# Patient Record
Sex: FEMALE | Race: WHITE | Employment: FULL TIME
[De-identification: names, ages, dates, MRNs, and addresses within clinical notes are randomized per-mention and may not be internally consistent; named-entity substitution may affect disease eponyms.]

---

## 2023-11-29 ENCOUNTER — HOSPITAL ENCOUNTER (OUTPATIENT)
Facility: HOSPITAL | Age: 23
Discharge: HOME | End: 2023-12-02
Attending: PSYCHIATRY & NEUROLOGY
Payer: COMMERCIAL

## 2023-11-29 VITALS
RESPIRATION RATE: 14 BRPM | DIASTOLIC BLOOD PRESSURE: 80 MMHG | HEART RATE: 82 BPM | OXYGEN SATURATION: 96 % | SYSTOLIC BLOOD PRESSURE: 121 MMHG

## 2023-11-29 DIAGNOSIS — G93.2 IDIOPATHIC INTRACRANIAL HYPERTENSION: ICD-10-CM

## 2023-11-29 PROCEDURE — 62328 DX LMBR SPI PNXR W/FLUOR/CT: CPT

## 2023-11-29 PROCEDURE — 2500000003 HC RX 250 WO HCPCS: Performed by: INTERNAL MEDICINE

## 2023-11-29 RX ORDER — LIDOCAINE HYDROCHLORIDE 10 MG/ML
10 INJECTION, SOLUTION EPIDURAL; INFILTRATION; INTRACAUDAL; PERINEURAL ONCE
Status: COMPLETED | OUTPATIENT
Start: 2023-11-29 | End: 2023-11-29

## 2023-11-29 RX ADMIN — LIDOCAINE HYDROCHLORIDE ANHYDROUS 5 ML: 10 INJECTION, SOLUTION INFILTRATION at 11:42

## 2023-11-29 NOTE — PROGRESS NOTES
1140- Pt brought to radiology post lumbar puncture supine on stretcher. Pt has no C/O pain and can wiggle toes. Baseline VS taken. Pt drinking water and eating cookies. 1200- Pt has no C/O pain, pt has been in contact with her father. Discharge time 1230. Discharge instructions given to pt, pt was given opportunity to ask questions and clarification was provided. Rashad Mclaughlin discharged pt to [facility] via wheelchair to the care of her father.

## 2023-12-01 ENCOUNTER — TELEMEDICINE (OUTPATIENT)
Dept: PRIMARY CARE CLINIC | Facility: CLINIC | Age: 23
End: 2023-12-01
Payer: COMMERCIAL

## 2023-12-01 DIAGNOSIS — F32.A ANXIETY AND DEPRESSION: Primary | ICD-10-CM

## 2023-12-01 DIAGNOSIS — F41.9 ANXIETY AND DEPRESSION: Primary | ICD-10-CM

## 2023-12-01 PROCEDURE — 99214 OFFICE O/P EST MOD 30 MIN: CPT | Performed by: FAMILY MEDICINE

## 2023-12-01 RX ORDER — CITALOPRAM 20 MG/1
20 TABLET ORAL DAILY
Qty: 90 TABLET | Refills: 2 | Status: SHIPPED | OUTPATIENT
Start: 2023-12-01

## 2023-12-01 ASSESSMENT — ENCOUNTER SYMPTOMS
NAUSEA: 0
ABDOMINAL PAIN: 0
VOMITING: 0
DIARRHEA: 0

## 2023-12-04 DIAGNOSIS — E66.01 CLASS 3 SEVERE OBESITY WITH BODY MASS INDEX (BMI) OF 40.0 TO 44.9 IN ADULT, UNSPECIFIED OBESITY TYPE, UNSPECIFIED WHETHER SERIOUS COMORBIDITY PRESENT (HCC): ICD-10-CM

## 2023-12-04 DIAGNOSIS — G93.2 PSEUDOTUMOR CEREBRI: Primary | ICD-10-CM

## 2023-12-04 RX ORDER — ACETAZOLAMIDE 250 MG/1
250 TABLET ORAL 2 TIMES DAILY
Qty: 60 TABLET | Refills: 3 | Status: SHIPPED | OUTPATIENT
Start: 2023-12-04

## 2023-12-21 DIAGNOSIS — Z13.29 SCREENING FOR HYPOTHYROIDISM: ICD-10-CM

## 2023-12-21 DIAGNOSIS — Z13.220 SCREENING FOR HYPERLIPIDEMIA: ICD-10-CM

## 2023-12-21 DIAGNOSIS — R73.9 BLOOD GLUCOSE ELEVATED: ICD-10-CM

## 2023-12-21 DIAGNOSIS — E66.01 CLASS 3 SEVERE OBESITY DUE TO EXCESS CALORIES WITH SERIOUS COMORBIDITY AND BODY MASS INDEX (BMI) OF 40.0 TO 44.9 IN ADULT (HCC): ICD-10-CM

## 2023-12-22 LAB
ALBUMIN SERPL-MCNC: 4.8 G/DL (ref 4–5)
ALBUMIN/GLOB SERPL: 2.2 {RATIO} (ref 1.2–2.2)
ALP SERPL-CCNC: 105 IU/L (ref 44–121)
ALT SERPL-CCNC: 29 IU/L (ref 0–32)
AST SERPL-CCNC: 21 IU/L (ref 0–40)
BILIRUB SERPL-MCNC: 0.3 MG/DL (ref 0–1.2)
BUN SERPL-MCNC: 8 MG/DL (ref 6–20)
BUN/CREAT SERPL: 12 (ref 9–23)
CALCIUM SERPL-MCNC: 9.9 MG/DL (ref 8.7–10.2)
CHLORIDE SERPL-SCNC: 103 MMOL/L (ref 96–106)
CHOLEST SERPL-MCNC: 190 MG/DL (ref 100–199)
CO2 SERPL-SCNC: 22 MMOL/L (ref 20–29)
CREAT SERPL-MCNC: 0.68 MG/DL (ref 0.57–1)
EGFRCR SERPLBLD CKD-EPI 2021: 124 ML/MIN/1.73
GLOBULIN SER CALC-MCNC: 2.2 G/DL (ref 1.5–4.5)
GLUCOSE SERPL-MCNC: 91 MG/DL (ref 70–99)
HBA1C MFR BLD: 5.1 % (ref 4.8–5.6)
HDLC SERPL-MCNC: 34 MG/DL
INSULIN SERPL-ACNC: 36.8 UIU/ML (ref 2.6–24.9)
LDLC SERPL CALC-MCNC: 137 MG/DL (ref 0–99)
POTASSIUM SERPL-SCNC: 4.3 MMOL/L (ref 3.5–5.2)
PROT SERPL-MCNC: 7 G/DL (ref 6–8.5)
SODIUM SERPL-SCNC: 140 MMOL/L (ref 134–144)
T4 FREE SERPL-MCNC: 1.09 NG/DL (ref 0.82–1.77)
TRIGL SERPL-MCNC: 104 MG/DL (ref 0–149)
TSH SERPL DL<=0.005 MIU/L-ACNC: 2.02 UIU/ML (ref 0.45–4.5)
VLDLC SERPL CALC-MCNC: 19 MG/DL (ref 5–40)

## 2024-01-05 ENCOUNTER — NURSE ONLY (OUTPATIENT)
Age: 24
End: 2024-01-05

## 2024-01-05 VITALS
DIASTOLIC BLOOD PRESSURE: 77 MMHG | OXYGEN SATURATION: 97 % | BODY MASS INDEX: 42.22 KG/M2 | RESPIRATION RATE: 18 BRPM | SYSTOLIC BLOOD PRESSURE: 112 MMHG | HEIGHT: 63 IN | TEMPERATURE: 98.2 F | WEIGHT: 238.3 LBS | HEART RATE: 86 BPM

## 2024-01-05 DIAGNOSIS — E66.01 CLASS 3 SEVERE OBESITY DUE TO EXCESS CALORIES WITH SERIOUS COMORBIDITY AND BODY MASS INDEX (BMI) OF 40.0 TO 44.9 IN ADULT (HCC): Primary | ICD-10-CM

## 2024-01-05 DIAGNOSIS — T14.90XA TRAUMA IN CHILDHOOD: ICD-10-CM

## 2024-01-05 DIAGNOSIS — G93.2 INTRACRANIAL HYPERTENSION: ICD-10-CM

## 2024-01-05 NOTE — PROGRESS NOTES
Identified patient with two patient identifiers (name and ). Reviewed chart in preparation for visit and have obtained necessary documentation.    Guerita Mathur is a 23 y.o. female  Chief Complaint   Patient presents with    Weight Management     Triage/Modified PARK/Matheus     /77 (Site: Right Lower Arm, Position: Sitting, Cuff Size: Large Adult)   Pulse 86   Temp 98.2 °F (36.8 °C) (Oral)   Resp 18   Ht 1.6 m (5' 3\")   Wt 108.1 kg (238 lb 4.8 oz)   SpO2 97%   BMI 42.21 kg/m²     1. Have you been to the ER, urgent care clinic since your last visit?  Hospitalized since your last visit?no    2. Have you seen or consulted any other health care providers outside of the [facility] since your last visit?  Include any pap smears or colon screening. no        Progress Note: Weekly Education Class in the [facility] Weight Loss Program         Patient is on Very Low Calorie Diet [] (4 meal replacements per day, 800 kcal/day)      Low Calorie Diet [x] (2-3 meal replacements per day, 4182-7424 kcal/day)    1) Did patient have any new symptoms or physical problems?   Yes [x]    No []    If yes, check & comment: weakness [], fatigue [], lightheadedness [], headache [x], cramps [], cold intolerance [], hair loss [], diarrhea [], constipation [],  NA [] other:                                  2) Has patient had any medical attention from other providers, urgent care or the emergency room this week?  Yes [x]  No []       NA [], If yes, why: hematology follow up                                      3) Any other sugar sweetened beverages consumed this week?   Yes [x]  No []    4) Did patient have any problems adhering to the diet? Yes [x]  No [] NA []    If yes, Vacation [], Celebrations [], Conferences [], Family Reunions [x] other:                                                 5) How many hours of sleep this week? 7-8    (range)  NA []    Number of meal replacements consumed daily? 1 (range)  NA  What Type Of Note Output Would You Prefer (Optional)?: Standard Output What Is The Reason For Today's Visit?: Full Body Skin Examination What Is The Reason For Today's Visit? (Being Monitored For X): the development of new lesions How Severe Are Your Spot(S)?: mild

## 2024-01-10 ENCOUNTER — OFFICE VISIT (OUTPATIENT)
Age: 24
End: 2024-01-10

## 2024-01-10 DIAGNOSIS — E66.01 CLASS 3 SEVERE OBESITY DUE TO EXCESS CALORIES WITH SERIOUS COMORBIDITY AND BODY MASS INDEX (BMI) OF 40.0 TO 44.9 IN ADULT (HCC): Primary | ICD-10-CM

## 2024-01-12 ENCOUNTER — TELEPHONE (OUTPATIENT)
Age: 24
End: 2024-01-12

## 2024-01-12 NOTE — TELEPHONE ENCOUNTER
Patient called regarding referral to counselor. Patient will contact insurance to verify coverage/ if [facility] is in network and follow up

## 2024-01-15 ENCOUNTER — OFFICE VISIT (OUTPATIENT)
Age: 24
End: 2024-01-15

## 2024-01-15 DIAGNOSIS — E66.01 CLASS 3 SEVERE OBESITY DUE TO EXCESS CALORIES WITH SERIOUS COMORBIDITY AND BODY MASS INDEX (BMI) OF 40.0 TO 44.9 IN ADULT (HCC): Primary | ICD-10-CM

## 2024-01-15 NOTE — PROGRESS NOTES
[facility]  Metabolic Program Initial Nutrition Consult    Date: 1/15/2024   Physician: Bettina Jarvis MD  Name: Guerita Seaman  :  2000    Type of Plan: LCD  Weeks on Plan: 1 month  Virtual visit was completed through Zoom.    ASSESSMENT:    Medications/Supplements:   Prior to Admission medications    Medication Sig Start Date End Date Taking? Authorizing Provider   acetaZOLAMIDE (DIAMOX) 250 MG tablet Take 1 tablet by mouth 2 times daily  Patient taking differently: Take 1 tablet by mouth daily 23   Luke Obrien Jr., MD   citalopram (CELEXA) 20 MG tablet Take 1 tablet by mouth daily 23   Aggie Us DO   SUMAtriptan (IMITREX) 100 MG tablet Take 1 tab at onset of severe headache; may repeat another in 2 hours if headaches persist 10/10/23   Luke Obrien Jr., MD   meclizine (ANTIVERT) 25 MG tablet TAKE 1 TABLET BY MOUTH THREE TIMES A DAY AS NEEDED FOR DIZZINESS 23   Provider, MD Cristobal   butalbital-acetaminophen-caffeine (FIORICET, ESGIC) -40 MG per tablet TAKE 1 TABLET BY MOUTH EVERY 8 HOURS AS NEEDED FOR HEADACHE 23   ProviderCristobal MD   levonorgestrel (MIRENA, 52 MG,) IUD 52 mg 1 Device by IntraUTERine route once    Automatic Reconciliation, Ar   rizatriptan (MAXALT) 10 MG tablet May repeat in 2 hours if needed. Do not exceed 2 tabs in 24 hours. 22   Automatic Reconciliation, Ar       Anthropometrics:    Ht:63\"   Wt: 241#    IBW: 115#    %IBW: 209%     BMI:42    Category: Obesity Class 3    238# on chart today, patient reports a 15# loss x one month.    Pt presents today for initial nutrition consult for the [facility] - Metabolic Program.      Has attempted wt loss through various methods wt loss of noom, kyle, ww, vegetarianism, Rx.      Exercise/Physical Activity:not reported    Started meal replacements:yes  If yes, how many per day:1    Aversions/side effects to meal

## 2024-01-16 NOTE — PROGRESS NOTES
[facility]  Metabolic Weight Loss Program        Patient's Name: Guerita Seaman  : 2000    This patient is a participant at [facility] and attended the weekly virtual nutrition class hosted via [employer].      Cheryl Sierra, MS, RD, LDN

## 2024-01-17 ENCOUNTER — OFFICE VISIT (OUTPATIENT)
Age: 24
End: 2024-01-17

## 2024-01-17 DIAGNOSIS — E66.01 CLASS 3 SEVERE OBESITY DUE TO EXCESS CALORIES WITH SERIOUS COMORBIDITY AND BODY MASS INDEX (BMI) OF 40.0 TO 44.9 IN ADULT (HCC): Primary | ICD-10-CM

## 2024-01-19 ENCOUNTER — TELEMEDICINE (OUTPATIENT)
Age: 24
End: 2024-01-19
Payer: COMMERCIAL

## 2024-01-19 VITALS
WEIGHT: 231.4 LBS | HEART RATE: 71 BPM | BODY MASS INDEX: 41 KG/M2 | SYSTOLIC BLOOD PRESSURE: 109 MMHG | DIASTOLIC BLOOD PRESSURE: 72 MMHG | HEIGHT: 63 IN

## 2024-01-19 DIAGNOSIS — G93.2 INTRACRANIAL HYPERTENSION: ICD-10-CM

## 2024-01-19 DIAGNOSIS — T14.90XA TRAUMA IN CHILDHOOD: ICD-10-CM

## 2024-01-19 DIAGNOSIS — E66.01 CLASS 3 SEVERE OBESITY DUE TO EXCESS CALORIES WITH SERIOUS COMORBIDITY AND BODY MASS INDEX (BMI) OF 40.0 TO 44.9 IN ADULT (HCC): Primary | ICD-10-CM

## 2024-01-19 DIAGNOSIS — R73.9 BLOOD GLUCOSE ELEVATED: ICD-10-CM

## 2024-01-19 PROCEDURE — 99214 OFFICE O/P EST MOD 30 MIN: CPT | Performed by: FAMILY MEDICINE

## 2024-01-19 ASSESSMENT — PATIENT HEALTH QUESTIONNAIRE - PHQ9
4. FEELING TIRED OR HAVING LITTLE ENERGY: 0
8. MOVING OR SPEAKING SO SLOWLY THAT OTHER PEOPLE COULD HAVE NOTICED. OR THE OPPOSITE, BEING SO FIGETY OR RESTLESS THAT YOU HAVE BEEN MOVING AROUND A LOT MORE THAN USUAL: 0
5. POOR APPETITE OR OVEREATING: 0
SUM OF ALL RESPONSES TO PHQ QUESTIONS 1-9: 0
3. TROUBLE FALLING OR STAYING ASLEEP: 0
1. LITTLE INTEREST OR PLEASURE IN DOING THINGS: 0
SUM OF ALL RESPONSES TO PHQ QUESTIONS 1-9: 0
9. THOUGHTS THAT YOU WOULD BE BETTER OFF DEAD, OR OF HURTING YOURSELF: 0
SUM OF ALL RESPONSES TO PHQ9 QUESTIONS 1 & 2: 0
6. FEELING BAD ABOUT YOURSELF - OR THAT YOU ARE A FAILURE OR HAVE LET YOURSELF OR YOUR FAMILY DOWN: 0
2. FEELING DOWN, DEPRESSED OR HOPELESS: 0
7. TROUBLE CONCENTRATING ON THINGS, SUCH AS READING THE NEWSPAPER OR WATCHING TELEVISION: 0

## 2024-01-19 NOTE — PROGRESS NOTES
[facility]  Metabolic Weight Loss Program        Patient's Name: Guerita Seaman  : 2000    This patient is a participant at [facility] and attended the weekly virtual nutrition class hosted via [employer].      Cheryl Sierra, MS, RD, LDN

## 2024-01-31 ENCOUNTER — OFFICE VISIT (OUTPATIENT)
Age: 24
End: 2024-01-31

## 2024-01-31 DIAGNOSIS — E66.01 CLASS 3 SEVERE OBESITY DUE TO EXCESS CALORIES WITH SERIOUS COMORBIDITY AND BODY MASS INDEX (BMI) OF 40.0 TO 44.9 IN ADULT (HCC): Primary | ICD-10-CM

## 2024-02-02 ENCOUNTER — NURSE ONLY (OUTPATIENT)
Age: 24
End: 2024-02-02

## 2024-02-02 ENCOUNTER — OFFICE VISIT (OUTPATIENT)
Dept: PRIMARY CARE CLINIC | Facility: CLINIC | Age: 24
End: 2024-02-02
Payer: COMMERCIAL

## 2024-02-02 VITALS
TEMPERATURE: 98.8 F | DIASTOLIC BLOOD PRESSURE: 66 MMHG | BODY MASS INDEX: 41.64 KG/M2 | RESPIRATION RATE: 17 BRPM | OXYGEN SATURATION: 97 % | SYSTOLIC BLOOD PRESSURE: 97 MMHG | HEART RATE: 90 BPM | HEIGHT: 63 IN | WEIGHT: 235 LBS

## 2024-02-02 VITALS
TEMPERATURE: 98.2 F | WEIGHT: 233.3 LBS | HEIGHT: 63 IN | HEART RATE: 80 BPM | DIASTOLIC BLOOD PRESSURE: 71 MMHG | SYSTOLIC BLOOD PRESSURE: 110 MMHG | OXYGEN SATURATION: 98 % | RESPIRATION RATE: 16 BRPM | BODY MASS INDEX: 41.34 KG/M2

## 2024-02-02 DIAGNOSIS — R68.89 FLU-LIKE SYMPTOMS: Primary | ICD-10-CM

## 2024-02-02 DIAGNOSIS — R05.9 COUGH, UNSPECIFIED TYPE: ICD-10-CM

## 2024-02-02 DIAGNOSIS — R09.81 SINUS CONGESTION: ICD-10-CM

## 2024-02-02 DIAGNOSIS — E66.01 CLASS 3 SEVERE OBESITY DUE TO EXCESS CALORIES WITH SERIOUS COMORBIDITY AND BODY MASS INDEX (BMI) OF 40.0 TO 44.9 IN ADULT (HCC): Primary | ICD-10-CM

## 2024-02-02 DIAGNOSIS — G93.2 INTRACRANIAL HYPERTENSION: ICD-10-CM

## 2024-02-02 LAB
GROUP A STREP ANTIGEN, POC: NEGATIVE
INFLUENZA A ANTIGEN, POC: NEGATIVE
INFLUENZA B ANTIGEN, POC: NEGATIVE
LOT EXPIRE DATE: NORMAL
LOT KIT NUMBER: NORMAL
SARS-COV-2 RNA, POC: NEGATIVE
VALID INTERNAL CONTROL, POC: YES
VALID INTERNAL CONTROL: YES
VENDOR AND KIT NAME POC: NORMAL

## 2024-02-02 PROCEDURE — 99213 OFFICE O/P EST LOW 20 MIN: CPT | Performed by: FAMILY MEDICINE

## 2024-02-02 PROCEDURE — 87428 SARSCOV & INF VIR A&B AG IA: CPT | Performed by: FAMILY MEDICINE

## 2024-02-02 PROCEDURE — 87880 STREP A ASSAY W/OPTIC: CPT | Performed by: FAMILY MEDICINE

## 2024-02-02 RX ORDER — BENZONATATE 100 MG/1
100 CAPSULE ORAL 3 TIMES DAILY PRN
Qty: 30 CAPSULE | Refills: 0 | Status: SHIPPED | OUTPATIENT
Start: 2024-02-02

## 2024-02-02 RX ORDER — AMOXICILLIN AND CLAVULANATE POTASSIUM 875; 125 MG/1; MG/1
1 TABLET, FILM COATED ORAL 2 TIMES DAILY
Qty: 14 TABLET | Refills: 0 | Status: SHIPPED | OUTPATIENT
Start: 2024-02-02 | End: 2024-02-09

## 2024-02-02 ASSESSMENT — PATIENT HEALTH QUESTIONNAIRE - PHQ9
10. IF YOU CHECKED OFF ANY PROBLEMS, HOW DIFFICULT HAVE THESE PROBLEMS MADE IT FOR YOU TO DO YOUR WORK, TAKE CARE OF THINGS AT HOME, OR GET ALONG WITH OTHER PEOPLE: 0
4. FEELING TIRED OR HAVING LITTLE ENERGY: 0
8. MOVING OR SPEAKING SO SLOWLY THAT OTHER PEOPLE COULD HAVE NOTICED. OR THE OPPOSITE, BEING SO FIGETY OR RESTLESS THAT YOU HAVE BEEN MOVING AROUND A LOT MORE THAN USUAL: 0
2. FEELING DOWN, DEPRESSED OR HOPELESS: 0
SUM OF ALL RESPONSES TO PHQ QUESTIONS 1-9: 0
1. LITTLE INTEREST OR PLEASURE IN DOING THINGS: 0
SUM OF ALL RESPONSES TO PHQ QUESTIONS 1-9: 0
9. THOUGHTS THAT YOU WOULD BE BETTER OFF DEAD, OR OF HURTING YOURSELF: 0
5. POOR APPETITE OR OVEREATING: 0
SUM OF ALL RESPONSES TO PHQ9 QUESTIONS 1 & 2: 0
SUM OF ALL RESPONSES TO PHQ QUESTIONS 1-9: 0
6. FEELING BAD ABOUT YOURSELF - OR THAT YOU ARE A FAILURE OR HAVE LET YOURSELF OR YOUR FAMILY DOWN: 0
3. TROUBLE FALLING OR STAYING ASLEEP: 0
SUM OF ALL RESPONSES TO PHQ QUESTIONS 1-9: 0
7. TROUBLE CONCENTRATING ON THINGS, SUCH AS READING THE NEWSPAPER OR WATCHING TELEVISION: 0

## 2024-02-02 ASSESSMENT — ENCOUNTER SYMPTOMS
COUGH: 1
SORE THROAT: 0

## 2024-02-02 NOTE — PROGRESS NOTES
[facility]  Metabolic Weight Loss Program        Patient's Name: Guerita Seaman  : 2000    This patient is a participant at [facility] and attended the weekly virtual nutrition class hosted via [employer].      Cheryl Sierra, MS, RD, LDN

## 2024-02-02 NOTE — PROGRESS NOTES
\"Have you been to the ER, urgent care clinic since your last visit?  Hospitalized since your last visit?\"    NO    “Have you seen or consulted any other health care providers outside of [facility] since your last visit?”    NO

## 2024-02-02 NOTE — PROGRESS NOTES
Identified patient with two patient identifiers (name and ). Reviewed chart in preparation for visit and have obtained necessary documentation.    Guerita Seaman is a 23 y.o. female  Chief Complaint   Patient presents with    Weight Management     Triage/michael/modified lcd     /71 (Site: Right Upper Arm, Position: Sitting, Cuff Size: Large Adult)   Pulse 80   Temp 98.2 °F (36.8 °C) (Oral)   Resp 16   Ht 1.6 m (5' 3\")   Wt 105.8 kg (233 lb 4.8 oz)   SpO2 98%   BMI 41.33 kg/m²     1. Have you been to the ER, urgent care clinic since your last visit?  Hospitalized since your last visit?no    2. Have you seen or consulted any other health care providers outside of the [facility] since your last visit?  Include any pap smears or colon screening. No        Progress Note: Weekly Education Class in the [facility] Weight Loss Program         Patient is on Very Low Calorie Diet [] (4 meal replacements per day, 800 kcal/day)      Low Calorie Diet [x] (2-3 meal replacements per day, 8893-1499 kcal/day)    1) Did patient have any new symptoms or physical problems?   Yes [x]    No []    If yes, check & comment: weakness [], fatigue [], lightheadedness [], headache [], cramps [], cold intolerance [], hair loss [], diarrhea [x], constipation [],  NA [] other:                                  2) Has patient had any medical attention from other providers, urgent care or the emergency room this week?  Yes []  No [x]       NA [], If yes, why:                                        3) Any other sugar sweetened beverages consumed this week?   Yes [x]  No []    4) Did patient have any problems adhering to the diet? Yes [x]  No [] NA []    If yes, Vacation [x], Celebrations [], Conferences [], Family Reunions [x] other: out of town for                                                5) How many hours of sleep this week? 7-10    (range)  NA []    Number of meal replacements consumed daily? 1-2

## 2024-02-02 NOTE — PROGRESS NOTES
HPI     Chief Complaint   Patient presents with    URI     Patient was exposed to pneumonia 2 weeks ago. She states that her blood process has been really high. Sinus pressure, heart rate is high, she states she has been feeling very cold.      She is a 23 y.o. female who presents for URI symptoms.     Symptoms started on Friday/ Saturday. Took COVID test at home on  that was negative. Endorses headaches, feels ears are popping, sore throat x 2 days, sinus pressure, congestion, cough. Cough is wet but she swallows the secretions. She feels more winded and short of breath then normal. States she is having chest pain. She feels the pain is more from coughing and does not think she needs an EKG. No other sick contacts that she knows of but did attend a large  recently. No fever. Endorses chills at night. No body aches.     She has started weight loss program at [facility]. Doing meal replacement program. States she has had some diarrhea when she went up to 2 replacement meals a day so she went back to 1.     Review of Systems   Constitutional:  Positive for chills. Negative for fever.   HENT:  Positive for congestion. Negative for sore throat.    Respiratory:  Positive for cough.       Reviewed PmHx, RxHx, FmHx, SocHx, AllgHx and updated and dated in the chart.    Physical Exam:  BP 97/66   Pulse 90   Temp 98.8 °F (37.1 °C) (Oral)   Resp 17   Ht 1.6 m (5' 3\")   Wt 106.6 kg (235 lb)   SpO2 97%   BMI 41.63 kg/m²   Physical Exam  Vitals and nursing note reviewed.   Constitutional:       General: She is not in acute distress.     Appearance: Normal appearance. She is not ill-appearing.   HENT:      Right Ear: Tympanic membrane normal.      Left Ear: Tympanic membrane normal.      Nose: Rhinorrhea present.      Mouth/Throat:      Mouth: Mucous membranes are moist.      Pharynx: Posterior oropharyngeal erythema present.   Cardiovascular:      Rate and Rhythm: Normal rate and regular rhythm.

## 2024-02-07 ENCOUNTER — OFFICE VISIT (OUTPATIENT)
Age: 24
End: 2024-02-07

## 2024-02-07 DIAGNOSIS — E66.01 CLASS 3 SEVERE OBESITY DUE TO EXCESS CALORIES WITH SERIOUS COMORBIDITY AND BODY MASS INDEX (BMI) OF 40.0 TO 44.9 IN ADULT (HCC): Primary | ICD-10-CM

## 2024-02-08 ENCOUNTER — OFFICE VISIT (OUTPATIENT)
Age: 24
End: 2024-02-08

## 2024-02-08 DIAGNOSIS — E66.01 CLASS 3 SEVERE OBESITY DUE TO EXCESS CALORIES WITH SERIOUS COMORBIDITY AND BODY MASS INDEX (BMI) OF 40.0 TO 44.9 IN ADULT (HCC): Primary | ICD-10-CM

## 2024-02-12 NOTE — PROGRESS NOTES
[facility]  Metabolic Weight Loss Program        Patient's Name: Guerita Seaman  : 2000    This patient is a participant at [facility] and attended the weekly virtual nutrition class hosted via [employer]      Cheryl Sierra, MS, RD, LDN

## 2024-02-13 ENCOUNTER — TELEPHONE (OUTPATIENT)
Age: 24
End: 2024-02-13

## 2024-02-13 NOTE — TELEPHONE ENCOUNTER
Called patient in regards to rescheduling her appointment for 2/16/24.Dr Jarvis is unavailable for patients appointment due to an emergency.

## 2024-02-14 ENCOUNTER — OFFICE VISIT (OUTPATIENT)
Age: 24
End: 2024-02-14

## 2024-02-14 DIAGNOSIS — E66.01 CLASS 3 SEVERE OBESITY DUE TO EXCESS CALORIES WITH SERIOUS COMORBIDITY AND BODY MASS INDEX (BMI) OF 40.0 TO 44.9 IN ADULT (HCC): Primary | ICD-10-CM

## 2024-02-14 NOTE — PROGRESS NOTES
[facility]  Metabolic Weight Loss Program        Patient's Name: Guerita Seaman  : 2000    This patient is a participant at [facility] and attended the weekly virtual nutrition class hosted via [employer].      Cheryl Sierra, MS, RD, LDN

## 2024-02-17 DIAGNOSIS — G93.2 PSEUDOTUMOR CEREBRI: ICD-10-CM

## 2024-02-19 RX ORDER — ACETAZOLAMIDE 250 MG/1
250 TABLET ORAL 2 TIMES DAILY
Qty: 180 TABLET | Refills: 1 | Status: SHIPPED | OUTPATIENT
Start: 2024-02-19

## 2024-02-21 ENCOUNTER — OFFICE VISIT (OUTPATIENT)
Age: 24
End: 2024-02-21

## 2024-02-21 DIAGNOSIS — E66.01 CLASS 3 SEVERE OBESITY DUE TO EXCESS CALORIES WITH SERIOUS COMORBIDITY AND BODY MASS INDEX (BMI) OF 40.0 TO 44.9 IN ADULT (HCC): Primary | ICD-10-CM

## 2024-02-22 ENCOUNTER — OFFICE VISIT (OUTPATIENT)
Age: 24
End: 2024-02-22
Payer: COMMERCIAL

## 2024-02-22 VITALS
RESPIRATION RATE: 16 BRPM | HEIGHT: 63 IN | HEART RATE: 74 BPM | TEMPERATURE: 98.8 F | SYSTOLIC BLOOD PRESSURE: 116 MMHG | OXYGEN SATURATION: 98 % | WEIGHT: 227 LBS | BODY MASS INDEX: 40.22 KG/M2 | DIASTOLIC BLOOD PRESSURE: 78 MMHG

## 2024-02-22 DIAGNOSIS — G44.86 CERVICOGENIC HEADACHE: ICD-10-CM

## 2024-02-22 DIAGNOSIS — R42 VERTIGO: ICD-10-CM

## 2024-02-22 DIAGNOSIS — G43.709 CHRONIC MIGRAINE W/O AURA, NOT INTRACTABLE, W/O STAT MIGR: ICD-10-CM

## 2024-02-22 DIAGNOSIS — G93.2 PSEUDOTUMOR CEREBRI: Primary | ICD-10-CM

## 2024-02-22 DIAGNOSIS — E66.01 CLASS 3 SEVERE OBESITY WITH BODY MASS INDEX (BMI) OF 40.0 TO 44.9 IN ADULT, UNSPECIFIED OBESITY TYPE, UNSPECIFIED WHETHER SERIOUS COMORBIDITY PRESENT (HCC): ICD-10-CM

## 2024-02-22 DIAGNOSIS — M54.81 OCCIPITAL NEURALGIA OF RIGHT SIDE: ICD-10-CM

## 2024-02-22 PROCEDURE — 99215 OFFICE O/P EST HI 40 MIN: CPT | Performed by: PSYCHIATRY & NEUROLOGY

## 2024-02-22 NOTE — PROGRESS NOTES
NEUROLOGY CLINIC NOTE    Patient ID:  Guerita Seaman  [id number]  23 y.o.  2000    Date of Visit:  February 22, 2024    Reason for Visit:  idiopathic intracranial hypertension, dizziness/vertigo    Chief Complaint   Patient presents with    Dizziness     Patient states 3 days after LP her dizziness has gotten worse and the Diamox has not helped and the having the dizziness on a daily occurrence since November and  has been getting worse and the dizzy spells last longer and affect her balance and vision.        History of Present Illness:     There are no problems to display for this patient.    Past Medical History:   Diagnosis Date    Ankylosing spondylitis (HCC)     was in process of w/u for this vs SLE.  w/u incomplete.    Disease of blood and blood forming organ     Migraine     PCOS (polycystic ovarian syndrome)     Routine Papanicolaou smear 12/30/2021    normal    Sleep difficulties     Von Willebrand disease (HCC)     borderline      Past Surgical History:   Procedure Laterality Date    WRIST FRACTURE SURGERY        Prior to Admission medications    Medication Sig Start Date End Date Taking? Authorizing Provider   acetaZOLAMIDE (DIAMOX) 250 MG tablet TAKE 1 TABLET BY MOUTH TWICE A DAY  Patient taking differently: Take 1 tablet by mouth daily 2/19/24  Yes Luke Obrien Jr., MD   citalopram (CELEXA) 20 MG tablet Take 1 tablet by mouth daily 12/1/23  Yes Aggie Us DO   SUMAtriptan (IMITREX) 100 MG tablet Take 1 tab at onset of severe headache; may repeat another in 2 hours if headaches persist 10/10/23  Yes Luek Obrien Jr., MD   meclizine (ANTIVERT) 25 MG tablet TAKE 1 TABLET BY MOUTH THREE TIMES A DAY AS NEEDED FOR DIZZINESS 7/14/23  Yes Provider, MD Cristobal   butalbital-acetaminophen-caffeine (FIORICET, ESGIC) -40 MG per tablet TAKE 1 TABLET BY MOUTH EVERY 8 HOURS AS NEEDED FOR HEADACHE 7/14/23  Yes ProviderCristobal MD   levonorgestrel (MIRENA, 52 MG,) IUD 52 mg 1

## 2024-02-23 ENCOUNTER — OFFICE VISIT (OUTPATIENT)
Age: 24
End: 2024-02-23

## 2024-02-23 DIAGNOSIS — E66.01 CLASS 3 SEVERE OBESITY DUE TO EXCESS CALORIES WITH SERIOUS COMORBIDITY AND BODY MASS INDEX (BMI) OF 40.0 TO 44.9 IN ADULT (HCC): Primary | ICD-10-CM

## 2024-02-23 NOTE — PROGRESS NOTES
[facility]  Metabolic Weight Loss Program        Patient's Name: Guerita Seaman  : 2000    This patient is a participant at [facility] and attended the weekly virtual nutrition class hosted via [employer].      Cheryl Sierra, MS, RD, LDN

## 2024-02-28 ENCOUNTER — OFFICE VISIT (OUTPATIENT)
Age: 24
End: 2024-02-28

## 2024-02-28 DIAGNOSIS — E66.01 CLASS 3 SEVERE OBESITY DUE TO EXCESS CALORIES WITH SERIOUS COMORBIDITY AND BODY MASS INDEX (BMI) OF 40.0 TO 44.9 IN ADULT (HCC): Primary | ICD-10-CM

## 2024-02-29 ENCOUNTER — HOSPITAL ENCOUNTER (OUTPATIENT)
Facility: HOSPITAL | Age: 24
Discharge: HOME | End: 2024-02-29
Attending: PSYCHIATRY & NEUROLOGY
Payer: COMMERCIAL

## 2024-02-29 DIAGNOSIS — G93.2 PSEUDOTUMOR CEREBRI: ICD-10-CM

## 2024-02-29 DIAGNOSIS — R42 VERTIGO: ICD-10-CM

## 2024-02-29 PROCEDURE — 70551 MRI BRAIN STEM W/O DYE: CPT

## 2024-03-01 NOTE — PROGRESS NOTES
[facility]  Metabolic Weight Loss Program        Patient's Name: Guerita Seaman  : 2000    This patient is a participant at [facility] and attended the weekly virtual nutrition class hosted via [employer].      Cheryl Sierra, MS, RD, LDN

## 2024-03-06 ENCOUNTER — OFFICE VISIT (OUTPATIENT)
Age: 24
End: 2024-03-06

## 2024-03-06 DIAGNOSIS — E66.01 CLASS 3 SEVERE OBESITY DUE TO EXCESS CALORIES WITH SERIOUS COMORBIDITY AND BODY MASS INDEX (BMI) OF 40.0 TO 44.9 IN ADULT (HCC): Primary | ICD-10-CM

## 2024-03-07 NOTE — PROGRESS NOTES
[facility]  Metabolic Weight Loss Program        Patient's Name: Guerita Seaman  : 2000    This patient is a participant at [facility] and attended the weekly virtual nutrition class hosted via [employer].      Cheryl Sierra, MS, RD, LDN

## 2024-03-13 ENCOUNTER — OFFICE VISIT (OUTPATIENT)
Age: 24
End: 2024-03-13

## 2024-03-13 DIAGNOSIS — E66.01 CLASS 3 SEVERE OBESITY DUE TO EXCESS CALORIES WITH SERIOUS COMORBIDITY AND BODY MASS INDEX (BMI) OF 40.0 TO 44.9 IN ADULT (HCC): Primary | ICD-10-CM

## 2024-03-15 ENCOUNTER — OFFICE VISIT (OUTPATIENT)
Age: 24
End: 2024-03-15

## 2024-03-15 VITALS
BODY MASS INDEX: 40.57 KG/M2 | WEIGHT: 229 LBS | HEART RATE: 88 BPM | SYSTOLIC BLOOD PRESSURE: 122 MMHG | DIASTOLIC BLOOD PRESSURE: 81 MMHG

## 2024-03-15 DIAGNOSIS — N83.202 CYST OF LEFT OVARY: ICD-10-CM

## 2024-03-15 DIAGNOSIS — Z01.419 ENCOUNTER FOR WELL WOMAN EXAM WITH ROUTINE GYNECOLOGICAL EXAM: Primary | ICD-10-CM

## 2024-03-15 DIAGNOSIS — Z12.4 SCREENING FOR CERVICAL CANCER: ICD-10-CM

## 2024-03-15 RX ORDER — CLINDAMYCIN PHOSPHATE 100 MG/5G
1 GEL VAGINAL ONCE
Qty: 5 G | Refills: 0 | Status: SHIPPED | OUTPATIENT
Start: 2024-03-15 | End: 2024-03-15

## 2024-03-15 NOTE — PROGRESS NOTES
Guerita Seaman is a 24 y.o. female returns for an annual exam     Chief Complaint   Patient presents with    Annual Exam    Ovarian Cyst       Patient's last menstrual period was 03/07/2024.  Her periods are light in flow and usually regular with a 26-32 day interval with 3-7 day duration.  She does not have dysmenorrhea.  Problems: \"some pain from the cyst\"  Birth Control: IUD.  Last Pap: normal obtained 12/30/21  She does not have a history of CHRISSY 2, 3 or cervical cancer.   With regard to the Gardisil vaccine, she declines to receive it      Also here today for an ultrasound for ovarian cyst and it showed:    TV ULTRASOUND PERFORMED. UTERUS IS ANTEVERTED, NORMAL IN SIZE AND ECHOGENICITY. ENDOMETRIUM MEASURES 6-7MM IN THICKNESS. NO EVIDENCE OF MASSES OR ABNORMALITIES ARE SEEN. IUD IS SEEN IN THE PROPER POSITION WITHIN THE ENDOMETRIAL CAVITY IN THE UTERINE FUNDUS. RIGHT OVARY APPEARS WITHIN NORMAL LIMITS. LEFT OVARY APPEARS TO HAVE A SIMPLE CYST THAT MEASURES 25 X 21 X 20MM. THERE APPEARS TO BE A DECREASE IN SIZE COMPARED TO 10/13/2023 ULTRASOUND. FREE FLUID SEEN IN THE CDS.     
lifestyle  Return for yearly wellness visits  Patient verbalized understanding        Orders Placed This Encounter    US NON OB TRANSVAGINAL     Standing Status:   Future     Standing Expiration Date:   3/15/2025    Pap Lb, rfx HPV ASCU     Order Specific Question:   Pap Source?          (Required)     Answer:   CERVIX / ENDOCERVIX     Order Specific Question:   Pap collection method?          (Required)     Answer:   BRUSH-SPATULA     Order Specific Question:   Menstrual Status ?     Answer:   IUD [[id number]]    Clindamycin Phosphate (XACIATO) 2 % GEL     Sig: Place 1 applicator vaginally once for 1 dose     Dispense:  5 g     Refill:  0

## 2024-03-20 ENCOUNTER — OFFICE VISIT (OUTPATIENT)
Age: 24
End: 2024-03-20

## 2024-03-20 DIAGNOSIS — E66.01 CLASS 3 SEVERE OBESITY DUE TO EXCESS CALORIES WITH SERIOUS COMORBIDITY AND BODY MASS INDEX (BMI) OF 40.0 TO 44.9 IN ADULT (HCC): Primary | ICD-10-CM

## 2024-03-20 LAB
., LABCORP: NORMAL
CYTOLOGIST CVX/VAG CYTO: NORMAL
CYTOLOGY CVX/VAG DOC CYTO: NORMAL
CYTOLOGY CVX/VAG DOC THIN PREP: NORMAL
DX ICD CODE: NORMAL
Lab: NORMAL
OTHER STN SPEC: NORMAL
STAT OF ADQ CVX/VAG CYTO-IMP: NORMAL

## 2024-03-21 ENCOUNTER — TELEPHONE (OUTPATIENT)
Dept: PRIMARY CARE CLINIC | Facility: CLINIC | Age: 24
End: 2024-03-21

## 2024-03-21 DIAGNOSIS — G93.2 PSEUDOTUMOR CEREBRI: Primary | ICD-10-CM

## 2024-03-21 DIAGNOSIS — G43.709 CHRONIC MIGRAINE W/O AURA, NOT INTRACTABLE, W/O STAT MIGR: ICD-10-CM

## 2024-03-21 RX ORDER — TOPIRAMATE 25 MG/1
TABLET ORAL
Qty: 120 TABLET | Refills: 5 | Status: SHIPPED | OUTPATIENT
Start: 2024-03-21

## 2024-03-21 NOTE — TELEPHONE ENCOUNTER
----- Message from Guerita Seaman sent at 3/21/2024  8:43 AM EDT -----  Regarding: Medications  Contact: [phone]  Good morning! I just had a quick question for you, my neurologist previously prescribed Sumitriptan for my headaches but I have not yet taken it. I've read that it may be iffy to take while I'm also on Celexa but I wanted to get your thoughts on this. Am I able to take both of them?

## 2024-03-22 ENCOUNTER — TELEPHONE (OUTPATIENT)
Age: 24
End: 2024-03-22

## 2024-03-22 ENCOUNTER — OFFICE VISIT (OUTPATIENT)
Age: 24
End: 2024-03-22

## 2024-03-22 ENCOUNTER — TELEMEDICINE (OUTPATIENT)
Age: 24
End: 2024-03-22
Payer: COMMERCIAL

## 2024-03-22 VITALS
TEMPERATURE: 98 F | WEIGHT: 226.1 LBS | DIASTOLIC BLOOD PRESSURE: 81 MMHG | SYSTOLIC BLOOD PRESSURE: 119 MMHG | HEIGHT: 63 IN | BODY MASS INDEX: 40.06 KG/M2

## 2024-03-22 DIAGNOSIS — E66.01 CLASS 3 SEVERE OBESITY DUE TO EXCESS CALORIES WITH SERIOUS COMORBIDITY AND BODY MASS INDEX (BMI) OF 40.0 TO 44.9 IN ADULT (HCC): Primary | ICD-10-CM

## 2024-03-22 DIAGNOSIS — R73.9 BLOOD GLUCOSE ELEVATED: ICD-10-CM

## 2024-03-22 DIAGNOSIS — G93.2 INTRACRANIAL HYPERTENSION: ICD-10-CM

## 2024-03-22 DIAGNOSIS — T14.90XA TRAUMA IN CHILDHOOD: ICD-10-CM

## 2024-03-22 PROCEDURE — 99213 OFFICE O/P EST LOW 20 MIN: CPT | Performed by: FAMILY MEDICINE

## 2024-03-22 RX ORDER — CLINDAMYCIN PHOSPHATE 100 MG/5G
GEL VAGINAL
COMMUNITY
Start: 2024-03-15

## 2024-03-22 ASSESSMENT — PATIENT HEALTH QUESTIONNAIRE - PHQ9
SUM OF ALL RESPONSES TO PHQ QUESTIONS 1-9: 0
7. TROUBLE CONCENTRATING ON THINGS, SUCH AS READING THE NEWSPAPER OR WATCHING TELEVISION: NOT AT ALL
3. TROUBLE FALLING OR STAYING ASLEEP: NOT AT ALL
SUM OF ALL RESPONSES TO PHQ QUESTIONS 1-9: 0
SUM OF ALL RESPONSES TO PHQ QUESTIONS 1-9: 0
4. FEELING TIRED OR HAVING LITTLE ENERGY: NOT AT ALL
1. LITTLE INTEREST OR PLEASURE IN DOING THINGS: NOT AT ALL
9. THOUGHTS THAT YOU WOULD BE BETTER OFF DEAD, OR OF HURTING YOURSELF: NOT AT ALL
6. FEELING BAD ABOUT YOURSELF - OR THAT YOU ARE A FAILURE OR HAVE LET YOURSELF OR YOUR FAMILY DOWN: NOT AT ALL
SUM OF ALL RESPONSES TO PHQ9 QUESTIONS 1 & 2: 0
SUM OF ALL RESPONSES TO PHQ QUESTIONS 1-9: 0
8. MOVING OR SPEAKING SO SLOWLY THAT OTHER PEOPLE COULD HAVE NOTICED. OR THE OPPOSITE, BEING SO FIGETY OR RESTLESS THAT YOU HAVE BEEN MOVING AROUND A LOT MORE THAN USUAL: NOT AT ALL
2. FEELING DOWN, DEPRESSED OR HOPELESS: NOT AT ALL
5. POOR APPETITE OR OVEREATING: NOT AT ALL

## 2024-03-22 NOTE — PROGRESS NOTES
Identified pt with two pt identifiers (name and ). Reviewed chart in preparation for visit and have obtained necessary documentation.    Guerita Seaman is a 24 y.o. female  Chief Complaint   Patient presents with    Weight Management     LCD     /81   Temp 98 °F (36.7 °C)   Ht 1.6 m (5' 3\")   Wt 102.6 kg (226 lb 1.6 oz)   LMP 2024   BMI 40.05 kg/m²     1. Have you been to the ER, urgent care clinic since your last visit?  Hospitalized since your last visit?no    2. Have you seen or consulted any other health care providers outside of the [facility] since your last visit?  Include any pap smears or colon screening. yes - ENT   
were discussed as indicated.  I advised her to contact the office if her condition worsens, changes or fails to improve as anticipated. She expressed understanding with the diagnosis(es) and plan.     Pursuant to the emergency declaration under the Romero Act and the National Emergencies Act, 1135 waiver authority and the Coronavirus Preparedness and Response Supplemental Appropriations Act, this Virtual  Visit was conducted, with patient's consent, to reduce the patient's risk of exposure to COVID-19 and provide continuity of care for an established patient.     Services were provided through a video synchronous discussion virtually to substitute for in-person clinic visit.    Bettina Jarvis MD

## 2024-03-22 NOTE — PROGRESS NOTES
weeks.  Reviewed LCD guidelines, followup one month.      Specific tips and techniques to facilitate compliance with above recommendations were provided and discussed.   If further details are desired please contact me at [phone].  This phone number was also provided to the patient for any further questions or concerns.          Cheryl Sierra, MS, RD, LDN

## 2024-03-22 NOTE — PROGRESS NOTES
[facility]  Metabolic Weight Loss Program        Patient's Name: Guerita Seaman  : 2000    This patient is a participant at [facility] and attended the weekly virtual nutrition class hosted via [employer].      Cheryl Sierra, MS, RD, LDN

## 2024-04-03 ENCOUNTER — OFFICE VISIT (OUTPATIENT)
Age: 24
End: 2024-04-03

## 2024-04-03 DIAGNOSIS — E66.01 CLASS 3 SEVERE OBESITY DUE TO EXCESS CALORIES WITH SERIOUS COMORBIDITY AND BODY MASS INDEX (BMI) OF 40.0 TO 44.9 IN ADULT (HCC): Primary | ICD-10-CM

## 2024-04-05 ENCOUNTER — OFFICE VISIT (OUTPATIENT)
Age: 24
End: 2024-04-05
Payer: COMMERCIAL

## 2024-04-05 VITALS
SYSTOLIC BLOOD PRESSURE: 110 MMHG | HEART RATE: 79 BPM | HEIGHT: 63 IN | RESPIRATION RATE: 16 BRPM | BODY MASS INDEX: 40.4 KG/M2 | TEMPERATURE: 97.1 F | WEIGHT: 228 LBS | DIASTOLIC BLOOD PRESSURE: 72 MMHG | OXYGEN SATURATION: 98 %

## 2024-04-05 DIAGNOSIS — M54.81 OCCIPITAL NEURALGIA OF RIGHT SIDE: ICD-10-CM

## 2024-04-05 DIAGNOSIS — G93.2 PSEUDOTUMOR CEREBRI: Primary | ICD-10-CM

## 2024-04-05 DIAGNOSIS — G44.86 CERVICOGENIC HEADACHE: ICD-10-CM

## 2024-04-05 DIAGNOSIS — E66.01 CLASS 3 SEVERE OBESITY WITH BODY MASS INDEX (BMI) OF 40.0 TO 44.9 IN ADULT, UNSPECIFIED OBESITY TYPE, UNSPECIFIED WHETHER SERIOUS COMORBIDITY PRESENT (HCC): ICD-10-CM

## 2024-04-05 DIAGNOSIS — G43.709 CHRONIC MIGRAINE W/O AURA, NOT INTRACTABLE, W/O STAT MIGR: ICD-10-CM

## 2024-04-05 PROCEDURE — 99214 OFFICE O/P EST MOD 30 MIN: CPT | Performed by: PSYCHIATRY & NEUROLOGY

## 2024-04-05 NOTE — PROGRESS NOTES
tab BID x 2 weeks; then 2 tabs BID 3/21/24   Luke Obrien Jr., MD     Allergies   Allergen Reactions    Other Rash     Medical tape    Saxenda [Liraglutide -Weight Management] Rash     Raised, red itchy rash around area of injection      Social History     Tobacco Use    Smoking status: Never    Smokeless tobacco: Never   Substance Use Topics    Alcohol use: Yes     Comment: soc      Family History   Problem Relation Age of Onset    Stroke Mother     Breast Cancer Mother 55        genetic testing negative     Other Mother         increased intracranial pressure (?genetic)    Skin Cancer Mother     Migraines Mother     Other Father         ankylosing spondylitis; Bruce-Danlos    Von Willebrands Disease Father     Migraines Father     Diabetes Maternal Grandmother     Breast Cancer Maternal Grandmother 83    Diabetes Maternal Grandfather         Subjective:      Guerita Seaman is a 24 y.o. female with a history of ankylosing spondylitis, PCOS, borderline Von Willebrand disease and migraine headaches who is here for further evaluation of her history of idiopathic intracranial hypertension. Patient is here for follow up.    Headaches ongoing since 2019  Diagnosed with migraine    Saw neurology at [place] twice before moving to [place] and treated with Rizatriptan as needed with benefit.    Sudden onset  Above the right eye and goes to the back of the neck  Stabbing pain around the right eye  When going to the back it is more pressure and throbbing  At its worst a 9/10  Associated with blurring of vision, light sensitivity, problem focusing, swishing sound in the ear  Lasts for a couple of hours to 1 day  Occurs almost daily  Out in the sun, it may trigger a headache. No worsening or relieving factors  Rizatriptan and tylenol    Works as a  and computer work  Problems staying asleep. 2 to 3 episodes of waking up. Averages 6 hours. Does not feel rested. Tired always. Admits to daytime sleepiness.

## 2024-04-08 NOTE — PROGRESS NOTES
[facility]  Metabolic Weight Loss Program        Patient's Name: Guerita Seaman  : 2000    This patient is a participant at [facility] and attended the weekly virtual nutrition class hosted via [email or web address].      Cheryl Sierra, MS, RD, LDN

## 2024-04-10 ENCOUNTER — OFFICE VISIT (OUTPATIENT)
Age: 24
End: 2024-04-10

## 2024-04-10 DIAGNOSIS — E66.01 CLASS 3 SEVERE OBESITY DUE TO EXCESS CALORIES WITH SERIOUS COMORBIDITY AND BODY MASS INDEX (BMI) OF 40.0 TO 44.9 IN ADULT (HCC): Primary | ICD-10-CM

## 2024-04-17 ENCOUNTER — OFFICE VISIT (OUTPATIENT)
Age: 24
End: 2024-04-17

## 2024-04-17 DIAGNOSIS — E66.01 CLASS 3 SEVERE OBESITY DUE TO EXCESS CALORIES WITH SERIOUS COMORBIDITY AND BODY MASS INDEX (BMI) OF 40.0 TO 44.9 IN ADULT (HCC): Primary | ICD-10-CM

## 2024-04-18 ENCOUNTER — OFFICE VISIT (OUTPATIENT)
Age: 24
End: 2024-04-18
Payer: COMMERCIAL

## 2024-04-18 VITALS
OXYGEN SATURATION: 98 % | SYSTOLIC BLOOD PRESSURE: 112 MMHG | WEIGHT: 227.8 LBS | HEART RATE: 83 BPM | BODY MASS INDEX: 40.36 KG/M2 | HEIGHT: 63 IN | TEMPERATURE: 98.4 F | RESPIRATION RATE: 18 BRPM | DIASTOLIC BLOOD PRESSURE: 76 MMHG

## 2024-04-18 DIAGNOSIS — E66.01 CLASS 3 SEVERE OBESITY DUE TO EXCESS CALORIES WITH SERIOUS COMORBIDITY AND BODY MASS INDEX (BMI) OF 40.0 TO 44.9 IN ADULT (HCC): Primary | ICD-10-CM

## 2024-04-18 DIAGNOSIS — T14.90XA TRAUMA IN CHILDHOOD: ICD-10-CM

## 2024-04-18 DIAGNOSIS — R73.9 BLOOD GLUCOSE ELEVATED: ICD-10-CM

## 2024-04-18 DIAGNOSIS — E28.2 PCOS (POLYCYSTIC OVARIAN SYNDROME): ICD-10-CM

## 2024-04-18 DIAGNOSIS — G93.2 INTRACRANIAL HYPERTENSION: ICD-10-CM

## 2024-04-18 PROCEDURE — 99214 OFFICE O/P EST MOD 30 MIN: CPT | Performed by: FAMILY MEDICINE

## 2024-04-18 RX ORDER — AZELAIC ACID 0.15 G/G
GEL TOPICAL AS NEEDED
COMMUNITY
Start: 2024-04-12

## 2024-04-18 ASSESSMENT — PATIENT HEALTH QUESTIONNAIRE - PHQ9
SUM OF ALL RESPONSES TO PHQ QUESTIONS 1-9: 0
2. FEELING DOWN, DEPRESSED OR HOPELESS: NOT AT ALL
SUM OF ALL RESPONSES TO PHQ QUESTIONS 1-9: 0
1. LITTLE INTEREST OR PLEASURE IN DOING THINGS: NOT AT ALL
SUM OF ALL RESPONSES TO PHQ9 QUESTIONS 1 & 2: 0

## 2024-04-18 NOTE — PROGRESS NOTES
Identified pt with two pt identifiers (name and ). Reviewed chart in preparation for visit and have obtained necessary documentation.    Guerita Seaman is a 24 y.o. female  Chief Complaint   Patient presents with    Weight Management     /76 (Site: Right Upper Arm, Position: Sitting, Cuff Size: Large Adult)   Pulse 83   Temp 98.4 °F (36.9 °C) (Oral)   Resp 18   Ht 1.6 m (5' 3\")   Wt 103.3 kg (227 lb 12.8 oz)   LMP 2024 (Exact Date)   SpO2 98%   BMI 40.35 kg/m²     1. Have you been to the ER, urgent care clinic since your last visit?  Hospitalized since your last visit?no    2. Have you seen or consulted any other health care providers outside of the [facility] since your last visit?  Include any pap smears or colon screening. No    BMI - 40.0

## 2024-04-18 NOTE — PROGRESS NOTES
[facility] Progress Note:   F/up Physician Visit    CC: Weight Management      Guerita Seaman is a 24 y.o. female who is here for her  f/up physician visit for the / LCD Program.    March 226  Now 227    Fell off of the process recently  She got back on yesterday        4/18/2024     2:00 PM 4/18/2024     1:52 PM 4/5/2024     8:13 AM 3/22/2024    11:00 AM 3/15/2024     2:07 PM 2/22/2024     7:47 AM 2/2/2024     2:33 PM   Weight Metrics   Weight  227 lb 12.8 oz 228 lb 226 lb 1.6 oz 229 lb 227 lb 235 lb   Neck (Inches) 16 in         Waist Measure Inches 43.25 in         Body Fat % 42.1 %         BMI (Calculated)  40.4 kg/m2 40.5 kg/m2 40.1 kg/m2 0 kg/m2 40.3 kg/m2 41.7 kg/m2          No data to display                   Current Outpatient Medications   Medication Sig Dispense Refill    Azelaic Acid 15 % GEL Apply topically as needed      topiramate (TOPAMAX) 25 MG tablet Take 1 tab BID x 2 weeks; then 2 tabs BID (Patient taking differently: Take 1 tablet by mouth 2 times daily Take 1 tab BID x 2 weeks; then 2 tabs BID) 120 tablet 5    citalopram (CELEXA) 20 MG tablet Take 1 tablet by mouth daily 90 tablet 2    SUMAtriptan (IMITREX) 100 MG tablet Take 1 tab at onset of severe headache; may repeat another in 2 hours if headaches persist 9 tablet 3    meclizine (ANTIVERT) 25 MG tablet TAKE 1 TABLET BY MOUTH THREE TIMES A DAY AS NEEDED FOR DIZZINESS      butalbital-acetaminophen-caffeine (FIORICET, ESGIC) -40 MG per tablet TAKE 1 TABLET BY MOUTH EVERY 8 HOURS AS NEEDED FOR HEADACHE      levonorgestrel (MIRENA, 52 MG,) IUD 52 mg 1 Device by IntraUTERine route once       No current facility-administered medications for this visit.          Participation   Did you attend clinic and class last week? yes    Review of Systems  Since your last visit, have you experienced any complications? no  If yes, please list:       Are you taking an appetite suppressant? yes  If so, is there any Chest Pain,

## 2024-04-22 NOTE — PROGRESS NOTES
[facility]  Metabolic Weight Loss Program        Patient's Name: Guerita Seaman  : 2000    This patient is a participant at [facility] and attended the weekly virtual nutrition class hosted via [employer].      Cheryl Sierra, MS, RD, LDN

## 2024-04-24 ENCOUNTER — OFFICE VISIT (OUTPATIENT)
Age: 24
End: 2024-04-24

## 2024-04-24 DIAGNOSIS — E66.01 CLASS 3 SEVERE OBESITY DUE TO EXCESS CALORIES WITH SERIOUS COMORBIDITY AND BODY MASS INDEX (BMI) OF 40.0 TO 44.9 IN ADULT (HCC): Primary | ICD-10-CM

## 2024-04-25 DIAGNOSIS — E66.01 CLASS 3 SEVERE OBESITY DUE TO EXCESS CALORIES WITH SERIOUS COMORBIDITY AND BODY MASS INDEX (BMI) OF 40.0 TO 44.9 IN ADULT (HCC): ICD-10-CM

## 2024-04-26 ENCOUNTER — OFFICE VISIT (OUTPATIENT)
Age: 24
End: 2024-04-26

## 2024-04-26 DIAGNOSIS — E66.01 CLASS 3 SEVERE OBESITY DUE TO EXCESS CALORIES WITH SERIOUS COMORBIDITY AND BODY MASS INDEX (BMI) OF 40.0 TO 44.9 IN ADULT (HCC): Primary | ICD-10-CM

## 2024-04-26 NOTE — PROGRESS NOTES
[facility]  Metabolic Program Follow-up Nutrition Consult    Date: 2024   Physician: CAROLA Jarvis MD  Name: Guerita Seaman  :  2000    Type of Plan: LCD  Weeks on Plan: 4 months  Virtual visit was completed through Zoom.    ASSESSMENT:    Medications/Supplements:   Prior to Admission medications    Medication Sig Start Date End Date Taking? Authorizing Provider   Azelaic Acid 15 % GEL Apply topically as needed 24   Cristobal Yeung MD   topiramate (TOPAMAX) 25 MG tablet Take 1 tab BID x 2 weeks; then 2 tabs BID  Patient taking differently: Take 1 tablet by mouth 2 times daily Take 1 tab BID x 2 weeks; then 2 tabs BID 3/21/24   Luke Obrien Jr., MD   citalopram (CELEXA) 20 MG tablet Take 1 tablet by mouth daily 23   Aggie Us DO   SUMAtriptan (IMITREX) 100 MG tablet Take 1 tab at onset of severe headache; may repeat another in 2 hours if headaches persist 10/10/23   Luke Obrien Jr., MD   meclizine (ANTIVERT) 25 MG tablet TAKE 1 TABLET BY MOUTH THREE TIMES A DAY AS NEEDED FOR DIZZINESS 23   Cristobal Yeung MD   butalbital-acetaminophen-caffeine (FIORICET, ESGIC) -40 MG per tablet TAKE 1 TABLET BY MOUTH EVERY 8 HOURS AS NEEDED FOR HEADACHE 23   Cristobal Yeung MD   levonorgestrel (MIRENA, 52 MG,) IUD 52 mg 1 Device by IntraUTERine route once    Automatic Reconciliation, Ar              Starting Weight: 241#  Current Weight: 227# (229# last visit one month ago)  Overall Pounds Lost: 14# Overall Pounds Gained: 0    Exercise/Physical Activity:not reported    Is patient using [facility] products:yes  If yes, how many per day:1    Aversions/side effects of product/program:none    Fluids used to mix with products:water    Reported Diet History:  1 ND shake daily until last few weeks, fell off track. Finances were tight. Hit a plateau.  Ate what she wanted.  Visit with physician re-motivated her, more protein.  [place]

## 2024-05-01 NOTE — PROGRESS NOTES
[facility]  Metabolic Weight Loss Program        Patient's Name: Guerita Seaman  : 2000    This patient is a participant at [facility] and attended the weekly virtual nutrition class hosted via [employer].      Cheryl Sierra, MS, RD, LDN

## 2024-05-02 ENCOUNTER — OFFICE VISIT (OUTPATIENT)
Age: 24
End: 2024-05-02

## 2024-05-02 DIAGNOSIS — E66.01 CLASS 3 SEVERE OBESITY DUE TO EXCESS CALORIES WITH SERIOUS COMORBIDITY AND BODY MASS INDEX (BMI) OF 40.0 TO 44.9 IN ADULT (HCC): Primary | ICD-10-CM

## 2024-05-03 ENCOUNTER — NURSE ONLY (OUTPATIENT)
Age: 24
End: 2024-05-03

## 2024-05-03 VITALS
HEART RATE: 81 BPM | HEIGHT: 63 IN | OXYGEN SATURATION: 94 % | TEMPERATURE: 97.9 F | WEIGHT: 225.4 LBS | SYSTOLIC BLOOD PRESSURE: 106 MMHG | RESPIRATION RATE: 16 BRPM | DIASTOLIC BLOOD PRESSURE: 71 MMHG | BODY MASS INDEX: 39.94 KG/M2

## 2024-05-03 DIAGNOSIS — R73.9 BLOOD GLUCOSE ELEVATED: ICD-10-CM

## 2024-05-03 DIAGNOSIS — E66.01 CLASS 2 SEVERE OBESITY DUE TO EXCESS CALORIES WITH SERIOUS COMORBIDITY AND BODY MASS INDEX (BMI) OF 39.0 TO 39.9 IN ADULT (HCC): Primary | ICD-10-CM

## 2024-05-03 DIAGNOSIS — G93.2 INTRACRANIAL HYPERTENSION: ICD-10-CM

## 2024-05-03 DIAGNOSIS — T14.90XA TRAUMA IN CHILDHOOD: ICD-10-CM

## 2024-05-03 DIAGNOSIS — E28.2 PCOS (POLYCYSTIC OVARIAN SYNDROME): ICD-10-CM

## 2024-05-03 ASSESSMENT — PATIENT HEALTH QUESTIONNAIRE - PHQ9
9. THOUGHTS THAT YOU WOULD BE BETTER OFF DEAD, OR OF HURTING YOURSELF: NOT AT ALL
6. FEELING BAD ABOUT YOURSELF - OR THAT YOU ARE A FAILURE OR HAVE LET YOURSELF OR YOUR FAMILY DOWN: NOT AT ALL
8. MOVING OR SPEAKING SO SLOWLY THAT OTHER PEOPLE COULD HAVE NOTICED. OR THE OPPOSITE, BEING SO FIGETY OR RESTLESS THAT YOU HAVE BEEN MOVING AROUND A LOT MORE THAN USUAL: NOT AT ALL
SUM OF ALL RESPONSES TO PHQ QUESTIONS 1-9: 0
2. FEELING DOWN, DEPRESSED OR HOPELESS: NOT AT ALL
3. TROUBLE FALLING OR STAYING ASLEEP: NOT AT ALL
SUM OF ALL RESPONSES TO PHQ QUESTIONS 1-9: 0
5. POOR APPETITE OR OVEREATING: NOT AT ALL
SUM OF ALL RESPONSES TO PHQ9 QUESTIONS 1 & 2: 0
1. LITTLE INTEREST OR PLEASURE IN DOING THINGS: NOT AT ALL
10. IF YOU CHECKED OFF ANY PROBLEMS, HOW DIFFICULT HAVE THESE PROBLEMS MADE IT FOR YOU TO DO YOUR WORK, TAKE CARE OF THINGS AT HOME, OR GET ALONG WITH OTHER PEOPLE: NOT DIFFICULT AT ALL
SUM OF ALL RESPONSES TO PHQ QUESTIONS 1-9: 0
SUM OF ALL RESPONSES TO PHQ QUESTIONS 1-9: 0
7. TROUBLE CONCENTRATING ON THINGS, SUCH AS READING THE NEWSPAPER OR WATCHING TELEVISION: NOT AT ALL

## 2024-05-03 NOTE — PROGRESS NOTES
No homework      Identified patient with two patient identifiers (name and ). Reviewed chart in preparation for visit and have obtained necessary documentation.    Guerita Seaman is a 24 y.o. female  Chief Complaint   Patient presents with    Weight Management     /71 (Site: Left Upper Arm, Position: Sitting, Cuff Size: Large Adult)   Pulse 81   Temp 97.9 °F (36.6 °C) (Oral)   Resp 16   Ht 1.6 m (5' 3\")   Wt 102.2 kg (225 lb 6.4 oz)   LMP 2024 (Exact Date)   SpO2 94%   BMI 39.93 kg/m²     1. Have you been to the ER, urgent care clinic since your last visit?  Hospitalized since your last visit?no    2. Have you seen or consulted any other health care providers outside of the [facility] since your last visit?  Include any pap smears or colon screening. no

## 2024-05-06 NOTE — PROGRESS NOTES
[facility]  Metabolic Weight Loss Program        Patient's Name: Guerita Seaman  : 2000    This patient is a participant at [facility] and attended the weekly virtual nutrition class hosted via [employer].      Cheryl Sierra, MS, RD, LDN

## 2024-05-09 NOTE — PROGRESS NOTES
Guerita Seaman is a 24 y.o. female presents for a problem visit.    Chief Complaint   Patient presents with    Ovarian Cyst     Patient's last menstrual period was 04/13/2024 (exact date).  Birth Control: IUD.  Last Pap: normal obtained 3/15/24    The patient is reporting having:  ovarian cyst .  She had an ultrasound done today that showed:    TV ULTRASOUND PERFORMED. UTERUS IS ANTEVERTED, NORMAL IN SIZE AND ECHOGENICITY. ENDOMETRIUM MEASURES 3-4MM IN THICKNESS. NO EVIDENCE OF MASSES OR ABNORMALITIES ARE SEEN. IUD IS SEEN IN THE PROPER POSITION WITHIN THE ENDOMETRIAL CAVITY IN THE UTERINE FUNDUS. RIGHT OVARY APPEARS TO HAVE A COMPLEX CYST WITH BLOODFLOW IN THE PERIPHERY THAT MEASURES 16 X 13 X 10MM. THIS MAY REPRESENT A HEMORRHAGIC CYST. LEFT OVARY APPEARS WITHIN NORMAL LIMITS. A FOLLICLE IS SEEN AND MEASURES 21 X 21MM. FREE FLUID SEEN IN THE CDS

## 2024-05-10 ENCOUNTER — OFFICE VISIT (OUTPATIENT)
Age: 24
End: 2024-05-10
Payer: COMMERCIAL

## 2024-05-10 VITALS
DIASTOLIC BLOOD PRESSURE: 77 MMHG | HEART RATE: 76 BPM | SYSTOLIC BLOOD PRESSURE: 122 MMHG | WEIGHT: 223 LBS | BODY MASS INDEX: 39.5 KG/M2

## 2024-05-10 DIAGNOSIS — N83.202 CYST OF LEFT OVARY: Primary | ICD-10-CM

## 2024-05-10 DIAGNOSIS — N83.201 CYSTS OF BOTH OVARIES: ICD-10-CM

## 2024-05-10 DIAGNOSIS — N83.202 CYSTS OF BOTH OVARIES: ICD-10-CM

## 2024-05-10 PROCEDURE — 99212 OFFICE O/P EST SF 10 MIN: CPT | Performed by: OBSTETRICS & GYNECOLOGY

## 2024-05-10 NOTE — PROGRESS NOTES
Per Nursing Note:  Guerita Seaman is a 24 y.o. female presents for a problem visit.         Chief Complaint   Patient presents with    Ovarian Cyst      Patient's last menstrual period was 2024 (exact date).  Birth Control: IUD.  Last Pap: normal obtained 3/15/24     The patient is reporting having:  ovarian cyst .  She had an ultrasound done today that showed:     TV ULTRASOUND PERFORMED. UTERUS IS ANTEVERTED, NORMAL IN SIZE AND ECHOGENICITY. ENDOMETRIUM MEASURES 3-4MM IN THICKNESS. NO EVIDENCE OF MASSES OR ABNORMALITIES ARE SEEN. IUD IS SEEN IN THE PROPER POSITION WITHIN THE ENDOMETRIAL CAVITY IN THE UTERINE FUNDUS. RIGHT OVARY APPEARS TO HAVE A COMPLEX CYST WITH BLOODFLOW IN THE PERIPHERY THAT MEASURES 16 X 13 X 10MM. THIS MAY REPRESENT A HEMORRHAGIC CYST. LEFT OVARY APPEARS WITHIN NORMAL LIMITS. A FOLLICLE IS SEEN AND MEASURES 21 X 21MM. FREE FLUID SEEN IN THE CDS           OB/GYN Problem Visit        Chief Complaint   Patient presents with    Ovarian Cyst       HPI  Guerita Seaman is a ,  24 y.o. female who presents for a problem visit.   Patient's last menstrual period was 2024 (exact date).    Seen 3/15/2024 for her annual exam.  At that visit an ultrasound was also done.    This showed an IUD in proper position.  LOV with persistent cyst, simple, 2 to 2.5 cm    Has Mirena, placed 2018, primarily Having pretty regular cycles.    US today shows persistent left adnexal cyst, measuring 2cm.  Ovarian vs paratubal  ROV with cyst 1.6mm, most consistent with collapsing CL [was not present on prev US, c/w physiologic cyst]  IUD in proper position.    TV ULTRASOUND PERFORMED. UTERUS IS ANTEVERTED, NORMAL IN SIZE AND ECHOGENICITY. ENDOMETRIUM MEASURES 3-4MM IN THICKNESS. NO EVIDENCE OF MASSES OR ABNORMALITIES ARE SEEN. IUD IS SEEN IN THE PROPER POSITION WITHIN THE ENDOMETRIAL CAVITY IN THE UTERINE FUNDUS. RIGHT OVARY APPEARS TO HAVE A COMPLEX CYST WITH BLOODFLOW IN THE PERIPHERY THAT MEASURES 16 X

## 2024-05-10 NOTE — PATIENT INSTRUCTIONS
If you are able to take NSAIDs, please take either ibuprofen 600-800 mg (3 or 4 of the over the counter 200mg pills) or Aleve 2 tabs.  Please take this 2 hours before your appointment and take with food.

## 2024-05-10 NOTE — PERIOP NOTE
RETURNED CALL TO [facility] (DR. POOL'S) THAT PAT IS NOT SCHEDULED .  REQUEST IN POSTING IS FOR \"TELEPHONE\".   PLEASE LET ME KNOW IF PAT IS NEEDED INSTEAD.

## 2024-05-13 ENCOUNTER — TELEPHONE (OUTPATIENT)
Dept: PRIMARY CARE CLINIC | Facility: CLINIC | Age: 24
End: 2024-05-13

## 2024-05-13 NOTE — TELEPHONE ENCOUNTER
----- Message from Samuel Manriquez sent at 5/10/2024  2:01 PM EDT -----  Subject: Appointment Request    Reason for Call: Established Patient Appointment needed: Routine Pre-Op    QUESTIONS    Reason for appointment request? Available appointments did not meet   patient need     Additional Information for Provider? Patient is having surgery on 6/6 and   is needing a pre op physical. She is okay to be seen by anyone. She is   having surgery on tonsils and she is not sure if she needs an EKG   ---------------------------------------------------------------------------  --------------  CALL BACK INFO  [phone]; OK to leave message on voicemail  ---------------------------------------------------------------------------  --------------  SCRIPT ANSWERS

## 2024-05-20 ENCOUNTER — OFFICE VISIT (OUTPATIENT)
Dept: PRIMARY CARE CLINIC | Facility: CLINIC | Age: 24
End: 2024-05-20
Payer: COMMERCIAL

## 2024-05-20 VITALS
HEIGHT: 63 IN | TEMPERATURE: 97.8 F | RESPIRATION RATE: 18 BRPM | BODY MASS INDEX: 39.69 KG/M2 | OXYGEN SATURATION: 98 % | HEART RATE: 70 BPM | SYSTOLIC BLOOD PRESSURE: 106 MMHG | WEIGHT: 224 LBS | DIASTOLIC BLOOD PRESSURE: 70 MMHG

## 2024-05-20 DIAGNOSIS — H10.9 CONJUNCTIVITIS OF BOTH EYES, UNSPECIFIED CONJUNCTIVITIS TYPE: Primary | ICD-10-CM

## 2024-05-20 DIAGNOSIS — J35.1 ENLARGED TONSILS: ICD-10-CM

## 2024-05-20 DIAGNOSIS — Z01.818 PRE-OP EVALUATION: ICD-10-CM

## 2024-05-20 LAB
ALBUMIN SERPL-MCNC: 4 G/DL (ref 3.5–5)
ALBUMIN/GLOB SERPL: 1.2 (ref 1.1–2.2)
ALP SERPL-CCNC: 124 U/L (ref 45–117)
ALT SERPL-CCNC: 27 U/L (ref 12–78)
ANION GAP SERPL CALC-SCNC: 5 MMOL/L (ref 5–15)
AST SERPL-CCNC: 17 U/L (ref 15–37)
BILIRUB SERPL-MCNC: 0.5 MG/DL (ref 0.2–1)
BUN SERPL-MCNC: 10 MG/DL (ref 6–20)
BUN/CREAT SERPL: 12 (ref 12–20)
CALCIUM SERPL-MCNC: 9.1 MG/DL (ref 8.5–10.1)
CHLORIDE SERPL-SCNC: 111 MMOL/L (ref 97–108)
CO2 SERPL-SCNC: 24 MMOL/L (ref 21–32)
CREAT SERPL-MCNC: 0.83 MG/DL (ref 0.55–1.02)
GLOBULIN SER CALC-MCNC: 3.3 G/DL (ref 2–4)
GLUCOSE SERPL-MCNC: 88 MG/DL (ref 65–100)
POTASSIUM SERPL-SCNC: 3.8 MMOL/L (ref 3.5–5.1)
PROT SERPL-MCNC: 7.3 G/DL (ref 6.4–8.2)
SODIUM SERPL-SCNC: 140 MMOL/L (ref 136–145)

## 2024-05-20 PROCEDURE — 99214 OFFICE O/P EST MOD 30 MIN: CPT | Performed by: INTERNAL MEDICINE

## 2024-05-20 RX ORDER — CIPROFLOXACIN HYDROCHLORIDE 3.5 MG/ML
2 SOLUTION/ DROPS TOPICAL EVERY 4 HOURS
Qty: 5 ML | Refills: 0 | Status: SHIPPED | OUTPATIENT
Start: 2024-05-20 | End: 2024-05-28

## 2024-05-20 NOTE — PROGRESS NOTES
uGerita Seaman is a 24 y.o. female and presents with     Chief Complaint   Patient presents with    Pre-op Exam     Patient is having tonsils taken out June 6th    Eye Problem     Patients eye are severely red and she is concerned       History of Present Illness  The patient presents for preoperative evaluation.    The patient is scheduled for a tonsillectomy on 06/06/2024 due to recurrent tonsil stones. She experienced a severe illness a few years ago, during which her tonsils swelled up to 85 percent and never fully recovered. Currently, her tonsils appear enlarged and lumpy. She frequently experiences tonsil stones.    The patient denies any history of malignant hyperthermia or abnormal bleeding. She underwent testing for Von Willebrand's disease, which yielded a negative result. She has a history of idiopathic intracranial hypertension, for which she was on oral contraceptives for an extended period, and PCOS. She denies experiencing chest pain, shortness of breath, nosebleeds, or rectal bleeding. Her surgical history includes wisdom teeth extraction and a surgical procedure for a fractured arm at the age of 4.    The patient reports severe bilateral eye pain, with the discomfort intensifying in the corner of one eye. She does not use contact lenses. She has papilledema from IIH and dry eyes. She frequently rubs her eyes excessively, but the current episode has been persistent throughout the weekend. She expresses concern about potential conjunctivitis at her workplace. She has been using Systane eye drops, which have not provided relief.    Supplemental Information  She has a Mirena IUD in place. She is taking Celexa. She takes meclizine and sumatriptan as needed, but she has not taken it in months.   The patient denies smoking or alcohol intake. She moved here from [place] in 2021. 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   Her father has Von Willebrand's disease.   She has

## 2024-05-22 ENCOUNTER — OFFICE VISIT (OUTPATIENT)
Age: 24
End: 2024-05-22

## 2024-05-22 DIAGNOSIS — E66.01 CLASS 3 SEVERE OBESITY DUE TO EXCESS CALORIES WITH SERIOUS COMORBIDITY AND BODY MASS INDEX (BMI) OF 40.0 TO 44.9 IN ADULT (HCC): Primary | ICD-10-CM

## 2024-05-24 ENCOUNTER — TELEMEDICINE (OUTPATIENT)
Age: 24
End: 2024-05-24
Payer: COMMERCIAL

## 2024-05-24 VITALS
BODY MASS INDEX: 39.34 KG/M2 | SYSTOLIC BLOOD PRESSURE: 112 MMHG | HEART RATE: 86 BPM | DIASTOLIC BLOOD PRESSURE: 74 MMHG | HEIGHT: 63 IN | WEIGHT: 222 LBS

## 2024-05-24 DIAGNOSIS — G93.2 INTRACRANIAL HYPERTENSION: ICD-10-CM

## 2024-05-24 DIAGNOSIS — R73.9 BLOOD GLUCOSE ELEVATED: ICD-10-CM

## 2024-05-24 DIAGNOSIS — T14.90XA TRAUMA IN CHILDHOOD: ICD-10-CM

## 2024-05-24 DIAGNOSIS — E66.01 CLASS 2 SEVERE OBESITY DUE TO EXCESS CALORIES WITH SERIOUS COMORBIDITY AND BODY MASS INDEX (BMI) OF 39.0 TO 39.9 IN ADULT (HCC): Primary | ICD-10-CM

## 2024-05-24 PROCEDURE — 99214 OFFICE O/P EST MOD 30 MIN: CPT | Performed by: FAMILY MEDICINE

## 2024-05-24 ASSESSMENT — PATIENT HEALTH QUESTIONNAIRE - PHQ9
5. POOR APPETITE OR OVEREATING: NOT AT ALL
8. MOVING OR SPEAKING SO SLOWLY THAT OTHER PEOPLE COULD HAVE NOTICED. OR THE OPPOSITE, BEING SO FIGETY OR RESTLESS THAT YOU HAVE BEEN MOVING AROUND A LOT MORE THAN USUAL: NOT AT ALL
SUM OF ALL RESPONSES TO PHQ9 QUESTIONS 1 & 2: 0
6. FEELING BAD ABOUT YOURSELF - OR THAT YOU ARE A FAILURE OR HAVE LET YOURSELF OR YOUR FAMILY DOWN: NOT AT ALL
2. FEELING DOWN, DEPRESSED OR HOPELESS: NOT AT ALL
9. THOUGHTS THAT YOU WOULD BE BETTER OFF DEAD, OR OF HURTING YOURSELF: NOT AT ALL
7. TROUBLE CONCENTRATING ON THINGS, SUCH AS READING THE NEWSPAPER OR WATCHING TELEVISION: NOT AT ALL
SUM OF ALL RESPONSES TO PHQ QUESTIONS 1-9: 0
4. FEELING TIRED OR HAVING LITTLE ENERGY: NOT AT ALL
SUM OF ALL RESPONSES TO PHQ QUESTIONS 1-9: 0
1. LITTLE INTEREST OR PLEASURE IN DOING THINGS: NOT AT ALL
SUM OF ALL RESPONSES TO PHQ QUESTIONS 1-9: 0
SUM OF ALL RESPONSES TO PHQ QUESTIONS 1-9: 0
3. TROUBLE FALLING OR STAYING ASLEEP: NOT AT ALL

## 2024-05-24 NOTE — PROGRESS NOTES
[facility] Progress Note:   F/up Physician Visit    Guerita Seaman is a 24 y.o. female who was seen by synchronous (real-time) audio-video technology on 5/24/2024.      Consent:  She and/or her healthcare decision maker is aware that this patient-initiated Telehealth encounter is a billable service, with coverage as determined by her insurance carrier. She is aware that she may receive a bill and has provided verbal consent to proceed: Yes    I was at home while conducting this encounter.      712  Subjective:   Guerita Seaman was seen for Weight Management ( 1 month Modified LCD )    f/up physician visit for the  LCD Program.  227 April  Now 222    She did well this month  Prior to Admission medications    Medication Sig Start Date End Date Taking? Authorizing Provider   ciprofloxacin (CILOXAN) 0.3 % ophthalmic solution Place 2 drops into both eyes every 4 hours for 8 days 5/20/24 5/28/24 Yes Roger Pope MD   Azelaic Acid 15 % GEL Apply topically as needed 4/12/24  Yes Provider, MD Cristobal   topiramate (TOPAMAX) 25 MG tablet Take 1 tab BID x 2 weeks; then 2 tabs BID  Patient taking differently: Take 1 tablet by mouth 2 times daily Take 1 tab BID x 2 weeks; then 2 tabs BID 3/21/24  Yes Luke Obrien Jr., MD   citalopram (CELEXA) 20 MG tablet Take 1 tablet by mouth daily 12/1/23  Yes Aggie Us DO   SUMAtriptan (IMITREX) 100 MG tablet Take 1 tab at onset of severe headache; may repeat another in 2 hours if headaches persist 10/10/23  Yes Luke Obrien Jr., MD   meclizine (ANTIVERT) 25 MG tablet TAKE 1 TABLET BY MOUTH THREE TIMES A DAY AS NEEDED FOR DIZZINESS 7/14/23  Yes ProviderCristobal MD   butalbital-acetaminophen-caffeine (FIORICET, ESGIC) -40 MG per tablet TAKE 1 TABLET BY MOUTH EVERY 8 HOURS AS NEEDED FOR HEADACHE 7/14/23  Yes Cristobal Yeung MD   levonorgestrel (MIRENA, 52 MG,) IUD 52 mg 1 Device by IntraUTERine route once   Yes

## 2024-05-24 NOTE — PROGRESS NOTES
Identified pt with two pt identifiers (name and ). Reviewed chart in preparation for visit and have obtained necessary documentation.    Guerita Seaman is a 24 y.o. female  Chief Complaint   Patient presents with    Weight Management      1 month Modified LCD      /74   Pulse 86   Ht 1.6 m (5' 3\")   Wt 100.7 kg (222 lb)   BMI 39.33 kg/m²     1. Have you been to the ER, urgent care clinic since your last visit?  Hospitalized since your last visit?no    2. Have you seen or consulted any other health care providers outside of the [facility] since your last visit?  Include any pap smears or colon screening. no

## 2024-05-24 NOTE — PROGRESS NOTES
[facility]  Metabolic Weight Loss Program        Patient's Name: Guerita Seaman  : 2000    This patient is a participant at [facility] and attended the weekly virtual nutrition class hosted via [employer]      Cheryl Sierra, MS, RD, LDN

## 2024-05-29 DIAGNOSIS — F41.9 ANXIETY AND DEPRESSION: ICD-10-CM

## 2024-05-29 DIAGNOSIS — F32.A ANXIETY AND DEPRESSION: ICD-10-CM

## 2024-05-29 RX ORDER — CITALOPRAM 20 MG/1
20 TABLET ORAL DAILY
Qty: 90 TABLET | Refills: 0 | Status: SHIPPED | OUTPATIENT
Start: 2024-05-29

## 2024-05-30 ENCOUNTER — HOSPITAL ENCOUNTER (OUTPATIENT)
Facility: HOSPITAL | Age: 24
Discharge: HOME | End: 2024-05-30
Payer: COMMERCIAL

## 2024-05-30 VITALS
RESPIRATION RATE: 18 BRPM | SYSTOLIC BLOOD PRESSURE: 118 MMHG | BODY MASS INDEX: 39.34 KG/M2 | TEMPERATURE: 99.2 F | HEART RATE: 81 BPM | WEIGHT: 222 LBS | HEIGHT: 63 IN | OXYGEN SATURATION: 96 % | DIASTOLIC BLOOD PRESSURE: 81 MMHG

## 2024-05-30 LAB
ANION GAP SERPL CALC-SCNC: 6 MMOL/L (ref 5–15)
BASOPHILS # BLD: 0.1 K/UL (ref 0–0.1)
BASOPHILS NFR BLD: 1 % (ref 0–1)
BUN SERPL-MCNC: 11 MG/DL (ref 6–20)
BUN/CREAT SERPL: 13 (ref 12–20)
CALCIUM SERPL-MCNC: 9.9 MG/DL (ref 8.5–10.1)
CHLORIDE SERPL-SCNC: 106 MMOL/L (ref 97–108)
CO2 SERPL-SCNC: 27 MMOL/L (ref 21–32)
CREAT SERPL-MCNC: 0.82 MG/DL (ref 0.55–1.02)
DIFFERENTIAL METHOD BLD: ABNORMAL
EOSINOPHIL # BLD: 0.2 K/UL (ref 0–0.4)
EOSINOPHIL NFR BLD: 2 % (ref 0–7)
ERYTHROCYTE [DISTWIDTH] IN BLOOD BY AUTOMATED COUNT: 12.8 % (ref 11.5–14.5)
GLUCOSE SERPL-MCNC: 108 MG/DL (ref 65–100)
HCT VFR BLD AUTO: 40.9 % (ref 35–47)
HGB BLD-MCNC: 14 G/DL (ref 11.5–16)
IMM GRANULOCYTES # BLD AUTO: 0.1 K/UL (ref 0–0.04)
IMM GRANULOCYTES NFR BLD AUTO: 0 % (ref 0–0.5)
LYMPHOCYTES # BLD: 2.7 K/UL (ref 0.8–3.5)
LYMPHOCYTES NFR BLD: 24 % (ref 12–49)
MCH RBC QN AUTO: 30.2 PG (ref 26–34)
MCHC RBC AUTO-ENTMCNC: 34.2 G/DL (ref 30–36.5)
MCV RBC AUTO: 88.3 FL (ref 80–99)
MONOCYTES # BLD: 0.8 K/UL (ref 0–1)
MONOCYTES NFR BLD: 7 % (ref 5–13)
NEUTS SEG # BLD: 7.5 K/UL (ref 1.8–8)
NEUTS SEG NFR BLD: 66 % (ref 32–75)
NRBC # BLD: 0 K/UL (ref 0–0.01)
NRBC BLD-RTO: 0 PER 100 WBC
PLATELET # BLD AUTO: 299 K/UL (ref 150–400)
PMV BLD AUTO: 11.9 FL (ref 8.9–12.9)
POTASSIUM SERPL-SCNC: 3.7 MMOL/L (ref 3.5–5.1)
RBC # BLD AUTO: 4.63 M/UL (ref 3.8–5.2)
SODIUM SERPL-SCNC: 139 MMOL/L (ref 136–145)
WBC # BLD AUTO: 11.4 K/UL (ref 3.6–11)

## 2024-05-30 PROCEDURE — 36415 COLL VENOUS BLD VENIPUNCTURE: CPT

## 2024-05-30 PROCEDURE — 85025 COMPLETE CBC W/AUTO DIFF WBC: CPT

## 2024-05-30 PROCEDURE — 80048 BASIC METABOLIC PNL TOTAL CA: CPT

## 2024-05-30 NOTE — PERIOP NOTE
[address] [address]   MAIN OR                                  [phone]    MAIN PRE OP             [phone]                                                                                AMBULATORY PRE OP          [phone]  PRE-ADMISSION TESTING    [phone]     Surgery Date:  06-       Is surgery arrival time given by surgeon?  NO    If “NO”, [facility] staff will call you between 4 and 7pm the day before your surgery with your arrival time. (If your surgery is on a Monday, we will call you the Friday before.)    Call [phone] after 7pm Monday-Friday if you did not receive this call.    INSTRUCTIONS BEFORE YOUR SURGERY   When You  Arrive Arrive at [facility] Patient Access on 1st floor the day of your surgery.  Have your insurance card, photo ID, living will/advanced directive/POA (if applicable),  and any copayment (if needed)   Food   and   Drink NO food or drink after midnight the night before surgery    This means NO water, gum, mints, coffee, juice, etc.  No alcohol (beer, wine, liquor) or marijuana (smoking) 24 hours prior to surgery, or Marijuana edibles for 3 days prior to surgery.  Stop smoking cigarettes 14 days before surgery (helps w/healing and breathing).   Medications to   TAKE   Morning of Surgery MEDICATIONS TO TAKE THE MORNING OF SURGERY WITH A SIP OF WATER: Topamax,Celexa    You may take these medications, IF NEEDED, the morning of surgery: Meclizine,Fioricet  Ask your surgeon/prescribing doctor for instructions on taking or stopping these medications prior to surgery:    Medications to STOP  before surgery Non-Steroidal anti-inflammatory Drugs (NSAID's): for example, Diclofenac (Voltaren), Ibuprofen (Advil, Motrin), Naproxen (Aleve) 7 days  STOP all herbal supplements and vitamins(unless prescribed by your doctor), and fish oil for 7 days  Other:   (Pain medications not listed above, including  smoking.  If you have diabetes, it is important for you to manage your blood sugar levels properly before your surgery as well as after your surgery. Poorly managed blood sugar levels slow down wound healing and prevent you from healing completely.         Follow all instructions so your surgery won’t be cancelled.  Please, be on time.                    If a situation occurs and you are delayed the day of surgery, call [phone]/ [phone].    If your physical condition changes (like a fever, cold, flu, etc.) call your surgeon as soon as possible.    Home medication(s) reviewed and verified via during PAT appointment/call.    The patient was contacted in person.     She verbalized understanding of all instructions does not need reinforcement.

## 2024-05-30 NOTE — PERIOP NOTE
General pre op and medication instructions printed and reviewed with patient. Two bottles of chg soap given. Surgical site infection sheet given. Opportunity for question given and questions answered.    Consent form signed and on chart for scheduled surgery on June 6, 2024 with Dr.Robert Dumont    Received Hematology notes and placed on medical chart

## 2024-06-05 ENCOUNTER — OFFICE VISIT (OUTPATIENT)
Age: 24
End: 2024-06-05

## 2024-06-05 DIAGNOSIS — E66.01 CLASS 2 SEVERE OBESITY DUE TO EXCESS CALORIES WITH SERIOUS COMORBIDITY AND BODY MASS INDEX (BMI) OF 39.0 TO 39.9 IN ADULT (HCC): Primary | ICD-10-CM

## 2024-06-06 ENCOUNTER — ANESTHESIA EVENT (OUTPATIENT)
Facility: HOSPITAL | Age: 24
End: 2024-06-06
Payer: COMMERCIAL

## 2024-06-06 ENCOUNTER — ANESTHESIA (OUTPATIENT)
Facility: HOSPITAL | Age: 24
End: 2024-06-06
Payer: COMMERCIAL

## 2024-06-06 ENCOUNTER — HOSPITAL ENCOUNTER (OUTPATIENT)
Facility: HOSPITAL | Age: 24
Setting detail: OUTPATIENT SURGERY
Discharge: HOME | End: 2024-06-06
Attending: OTOLARYNGOLOGY | Admitting: OTOLARYNGOLOGY
Payer: COMMERCIAL

## 2024-06-06 VITALS
OXYGEN SATURATION: 93 % | DIASTOLIC BLOOD PRESSURE: 61 MMHG | WEIGHT: 222 LBS | RESPIRATION RATE: 17 BRPM | BODY MASS INDEX: 39.33 KG/M2 | HEART RATE: 100 BPM | TEMPERATURE: 97.2 F | SYSTOLIC BLOOD PRESSURE: 127 MMHG

## 2024-06-06 DIAGNOSIS — G89.18 POST-OP PAIN: Primary | ICD-10-CM

## 2024-06-06 LAB — HCG UR QL: NEGATIVE

## 2024-06-06 PROCEDURE — 3700000001 HC ADD 15 MINUTES (ANESTHESIA): Performed by: OTOLARYNGOLOGY

## 2024-06-06 PROCEDURE — 81025 URINE PREGNANCY TEST: CPT

## 2024-06-06 PROCEDURE — 6360000002 HC RX W HCPCS: Performed by: ANESTHESIOLOGY

## 2024-06-06 PROCEDURE — 7100000001 HC PACU RECOVERY - ADDTL 15 MIN: Performed by: OTOLARYNGOLOGY

## 2024-06-06 PROCEDURE — 2500000003 HC RX 250 WO HCPCS

## 2024-06-06 PROCEDURE — 3600000012 HC SURGERY LEVEL 2 ADDTL 15MIN: Performed by: OTOLARYNGOLOGY

## 2024-06-06 PROCEDURE — 2580000003 HC RX 258: Performed by: ANESTHESIOLOGY

## 2024-06-06 PROCEDURE — 2500000003 HC RX 250 WO HCPCS: Performed by: OTOLARYNGOLOGY

## 2024-06-06 PROCEDURE — 2720000010 HC SURG SUPPLY STERILE: Performed by: OTOLARYNGOLOGY

## 2024-06-06 PROCEDURE — 6360000002 HC RX W HCPCS

## 2024-06-06 PROCEDURE — 7100000000 HC PACU RECOVERY - FIRST 15 MIN: Performed by: OTOLARYNGOLOGY

## 2024-06-06 PROCEDURE — 3700000000 HC ANESTHESIA ATTENDED CARE: Performed by: OTOLARYNGOLOGY

## 2024-06-06 PROCEDURE — 2709999900 HC NON-CHARGEABLE SUPPLY: Performed by: OTOLARYNGOLOGY

## 2024-06-06 PROCEDURE — 88304 TISSUE EXAM BY PATHOLOGIST: CPT

## 2024-06-06 PROCEDURE — 3600000002 HC SURGERY LEVEL 2 BASE: Performed by: OTOLARYNGOLOGY

## 2024-06-06 RX ORDER — ONDANSETRON 2 MG/ML
INJECTION INTRAMUSCULAR; INTRAVENOUS PRN
Status: DISCONTINUED | OUTPATIENT
Start: 2024-06-06 | End: 2024-06-06 | Stop reason: SDUPTHER

## 2024-06-06 RX ORDER — PROPOFOL 10 MG/ML
INJECTION, EMULSION INTRAVENOUS PRN
Status: DISCONTINUED | OUTPATIENT
Start: 2024-06-06 | End: 2024-06-06 | Stop reason: SDUPTHER

## 2024-06-06 RX ORDER — METHYLPREDNISOLONE 4 MG/1
TABLET ORAL
Qty: 1 KIT | Refills: 0 | Status: SHIPPED | OUTPATIENT
Start: 2024-06-06 | End: 2024-06-12

## 2024-06-06 RX ORDER — SODIUM CHLORIDE 9 MG/ML
INJECTION, SOLUTION INTRAVENOUS PRN
Status: DISCONTINUED | OUTPATIENT
Start: 2024-06-06 | End: 2024-06-06 | Stop reason: HOSPADM

## 2024-06-06 RX ORDER — ROCURONIUM BROMIDE 10 MG/ML
INJECTION, SOLUTION INTRAVENOUS PRN
Status: DISCONTINUED | OUTPATIENT
Start: 2024-06-06 | End: 2024-06-06 | Stop reason: SDUPTHER

## 2024-06-06 RX ORDER — SODIUM CHLORIDE 0.9 % (FLUSH) 0.9 %
5-40 SYRINGE (ML) INJECTION PRN
Status: DISCONTINUED | OUTPATIENT
Start: 2024-06-06 | End: 2024-06-06 | Stop reason: HOSPADM

## 2024-06-06 RX ORDER — ONDANSETRON 2 MG/ML
4 INJECTION INTRAMUSCULAR; INTRAVENOUS
Status: COMPLETED | OUTPATIENT
Start: 2024-06-06 | End: 2024-06-06

## 2024-06-06 RX ORDER — PROCHLORPERAZINE EDISYLATE 5 MG/ML
5 INJECTION INTRAMUSCULAR; INTRAVENOUS
Status: COMPLETED | OUTPATIENT
Start: 2024-06-06 | End: 2024-06-06

## 2024-06-06 RX ORDER — AMOXICILLIN 400 MG/5ML
800 POWDER, FOR SUSPENSION ORAL 2 TIMES DAILY
Qty: 200 ML | Refills: 0 | Status: SHIPPED | OUTPATIENT
Start: 2024-06-06 | End: 2024-06-16

## 2024-06-06 RX ORDER — CEFAZOLIN SODIUM 1 G/3ML
INJECTION, POWDER, FOR SOLUTION INTRAMUSCULAR; INTRAVENOUS PRN
Status: DISCONTINUED | OUTPATIENT
Start: 2024-06-06 | End: 2024-06-06 | Stop reason: SDUPTHER

## 2024-06-06 RX ORDER — DEXMEDETOMIDINE HYDROCHLORIDE 100 UG/ML
INJECTION, SOLUTION INTRAVENOUS PRN
Status: DISCONTINUED | OUTPATIENT
Start: 2024-06-06 | End: 2024-06-06 | Stop reason: SDUPTHER

## 2024-06-06 RX ORDER — HYDRALAZINE HYDROCHLORIDE 20 MG/ML
10 INJECTION INTRAMUSCULAR; INTRAVENOUS
Status: DISCONTINUED | OUTPATIENT
Start: 2024-06-06 | End: 2024-06-06 | Stop reason: HOSPADM

## 2024-06-06 RX ORDER — BUPIVACAINE HYDROCHLORIDE AND EPINEPHRINE 2.5; 5 MG/ML; UG/ML
INJECTION, SOLUTION EPIDURAL; INFILTRATION; INTRACAUDAL; PERINEURAL PRN
Status: DISCONTINUED | OUTPATIENT
Start: 2024-06-06 | End: 2024-06-06 | Stop reason: HOSPADM

## 2024-06-06 RX ORDER — HYDROMORPHONE HYDROCHLORIDE 1 MG/ML
0.5 INJECTION, SOLUTION INTRAMUSCULAR; INTRAVENOUS; SUBCUTANEOUS EVERY 5 MIN PRN
Status: DISCONTINUED | OUTPATIENT
Start: 2024-06-06 | End: 2024-06-06 | Stop reason: HOSPADM

## 2024-06-06 RX ORDER — MIDAZOLAM HYDROCHLORIDE 2 MG/2ML
2 INJECTION, SOLUTION INTRAMUSCULAR; INTRAVENOUS
Status: DISCONTINUED | OUTPATIENT
Start: 2024-06-06 | End: 2024-06-06 | Stop reason: HOSPADM

## 2024-06-06 RX ORDER — LIDOCAINE HYDROCHLORIDE 20 MG/ML
INJECTION, SOLUTION EPIDURAL; INFILTRATION; INTRACAUDAL; PERINEURAL PRN
Status: DISCONTINUED | OUTPATIENT
Start: 2024-06-06 | End: 2024-06-06 | Stop reason: SDUPTHER

## 2024-06-06 RX ORDER — FENTANYL CITRATE 50 UG/ML
100 INJECTION, SOLUTION INTRAMUSCULAR; INTRAVENOUS
Status: DISCONTINUED | OUTPATIENT
Start: 2024-06-06 | End: 2024-06-06 | Stop reason: HOSPADM

## 2024-06-06 RX ORDER — OXYCODONE HYDROCHLORIDE 5 MG/1
5 TABLET ORAL EVERY 6 HOURS PRN
Qty: 28 TABLET | Refills: 0 | Status: SHIPPED | OUTPATIENT
Start: 2024-06-06 | End: 2024-06-13

## 2024-06-06 RX ORDER — SODIUM CHLORIDE 0.9 % (FLUSH) 0.9 %
5-40 SYRINGE (ML) INJECTION EVERY 12 HOURS SCHEDULED
Status: DISCONTINUED | OUTPATIENT
Start: 2024-06-06 | End: 2024-06-06 | Stop reason: HOSPADM

## 2024-06-06 RX ORDER — MIDAZOLAM HYDROCHLORIDE 1 MG/ML
INJECTION INTRAMUSCULAR; INTRAVENOUS PRN
Status: DISCONTINUED | OUTPATIENT
Start: 2024-06-06 | End: 2024-06-06 | Stop reason: SDUPTHER

## 2024-06-06 RX ORDER — FENTANYL CITRATE 50 UG/ML
25 INJECTION, SOLUTION INTRAMUSCULAR; INTRAVENOUS EVERY 5 MIN PRN
Status: DISCONTINUED | OUTPATIENT
Start: 2024-06-06 | End: 2024-06-06 | Stop reason: HOSPADM

## 2024-06-06 RX ORDER — OXYCODONE HYDROCHLORIDE 5 MG/1
5 TABLET ORAL
Status: DISCONTINUED | OUTPATIENT
Start: 2024-06-06 | End: 2024-06-06 | Stop reason: HOSPADM

## 2024-06-06 RX ORDER — FENTANYL CITRATE 50 UG/ML
INJECTION, SOLUTION INTRAMUSCULAR; INTRAVENOUS PRN
Status: DISCONTINUED | OUTPATIENT
Start: 2024-06-06 | End: 2024-06-06 | Stop reason: SDUPTHER

## 2024-06-06 RX ORDER — SODIUM CHLORIDE, SODIUM LACTATE, POTASSIUM CHLORIDE, CALCIUM CHLORIDE 600; 310; 30; 20 MG/100ML; MG/100ML; MG/100ML; MG/100ML
INJECTION, SOLUTION INTRAVENOUS CONTINUOUS
Status: DISCONTINUED | OUTPATIENT
Start: 2024-06-06 | End: 2024-06-06 | Stop reason: HOSPADM

## 2024-06-06 RX ORDER — ACETAMINOPHEN 500 MG
1000 TABLET ORAL ONCE
Status: DISCONTINUED | OUTPATIENT
Start: 2024-06-06 | End: 2024-06-06 | Stop reason: HOSPADM

## 2024-06-06 RX ORDER — LIDOCAINE HYDROCHLORIDE 10 MG/ML
1 INJECTION, SOLUTION INFILTRATION; PERINEURAL
Status: DISCONTINUED | OUTPATIENT
Start: 2024-06-06 | End: 2024-06-06 | Stop reason: HOSPADM

## 2024-06-06 RX ORDER — DEXAMETHASONE SODIUM PHOSPHATE 4 MG/ML
INJECTION, SOLUTION INTRA-ARTICULAR; INTRALESIONAL; INTRAMUSCULAR; INTRAVENOUS; SOFT TISSUE PRN
Status: DISCONTINUED | OUTPATIENT
Start: 2024-06-06 | End: 2024-06-06 | Stop reason: SDUPTHER

## 2024-06-06 RX ORDER — SUCCINYLCHOLINE/SOD CL,ISO/PF 200MG/10ML
SYRINGE (ML) INTRAVENOUS PRN
Status: DISCONTINUED | OUTPATIENT
Start: 2024-06-06 | End: 2024-06-06 | Stop reason: SDUPTHER

## 2024-06-06 RX ORDER — NALOXONE HYDROCHLORIDE 0.4 MG/ML
INJECTION, SOLUTION INTRAMUSCULAR; INTRAVENOUS; SUBCUTANEOUS PRN
Status: DISCONTINUED | OUTPATIENT
Start: 2024-06-06 | End: 2024-06-06 | Stop reason: HOSPADM

## 2024-06-06 RX ORDER — ONDANSETRON 8 MG/1
8 TABLET, ORALLY DISINTEGRATING ORAL EVERY 8 HOURS PRN
Qty: 8 TABLET | Refills: 1 | Status: SHIPPED | OUTPATIENT
Start: 2024-06-06

## 2024-06-06 RX ADMIN — PROCHLORPERAZINE EDISYLATE 5 MG: 5 INJECTION INTRAMUSCULAR; INTRAVENOUS at 11:11

## 2024-06-06 RX ADMIN — SODIUM CHLORIDE, POTASSIUM CHLORIDE, SODIUM LACTATE AND CALCIUM CHLORIDE: 600; 310; 30; 20 INJECTION, SOLUTION INTRAVENOUS at 08:54

## 2024-06-06 RX ADMIN — FENTANYL CITRATE 50 MCG: 50 INJECTION INTRAMUSCULAR; INTRAVENOUS at 09:52

## 2024-06-06 RX ADMIN — DEXAMETHASONE SODIUM PHOSPHATE 8 MG: 4 INJECTION INTRA-ARTICULAR; INTRALESIONAL; INTRAMUSCULAR; INTRAVENOUS; SOFT TISSUE at 09:53

## 2024-06-06 RX ADMIN — ONDANSETRON 4 MG: 2 INJECTION INTRAMUSCULAR; INTRAVENOUS at 10:12

## 2024-06-06 RX ADMIN — DEXMEDETOMIDINE HYDROCHLORIDE 10 MCG: 100 INJECTION, SOLUTION, CONCENTRATE INTRAVENOUS at 09:41

## 2024-06-06 RX ADMIN — ONDANSETRON 4 MG: 2 INJECTION INTRAMUSCULAR; INTRAVENOUS at 10:46

## 2024-06-06 RX ADMIN — ONDANSETRON 4 MG: 2 INJECTION INTRAMUSCULAR; INTRAVENOUS at 09:53

## 2024-06-06 RX ADMIN — DEXMEDETOMIDINE HYDROCHLORIDE 8 MCG: 100 INJECTION, SOLUTION, CONCENTRATE INTRAVENOUS at 10:28

## 2024-06-06 RX ADMIN — SODIUM CHLORIDE, POTASSIUM CHLORIDE, SODIUM LACTATE AND CALCIUM CHLORIDE: 600; 310; 30; 20 INJECTION, SOLUTION INTRAVENOUS at 09:20

## 2024-06-06 RX ADMIN — MIDAZOLAM 4 MG: 1 INJECTION INTRAMUSCULAR; INTRAVENOUS at 09:38

## 2024-06-06 RX ADMIN — SUGAMMADEX 200 MG: 100 INJECTION, SOLUTION INTRAVENOUS at 10:13

## 2024-06-06 RX ADMIN — Medication 160 MG: at 09:44

## 2024-06-06 RX ADMIN — LIDOCAINE HYDROCHLORIDE 80 MG: 20 INJECTION, SOLUTION EPIDURAL; INFILTRATION; INTRACAUDAL; PERINEURAL at 09:44

## 2024-06-06 RX ADMIN — FENTANYL CITRATE 50 MCG: 50 INJECTION INTRAMUSCULAR; INTRAVENOUS at 09:44

## 2024-06-06 RX ADMIN — ROCURONIUM BROMIDE 20 MG: 50 INJECTION INTRAVENOUS at 09:53

## 2024-06-06 RX ADMIN — DEXMEDETOMIDINE HYDROCHLORIDE 10 MCG: 100 INJECTION, SOLUTION, CONCENTRATE INTRAVENOUS at 10:22

## 2024-06-06 RX ADMIN — ROCURONIUM BROMIDE 5 MG: 50 INJECTION INTRAVENOUS at 09:44

## 2024-06-06 RX ADMIN — CEFAZOLIN 2 G: 1 INJECTION, POWDER, FOR SOLUTION INTRAMUSCULAR; INTRAVENOUS at 09:53

## 2024-06-06 RX ADMIN — PROPOFOL 200 MG: 10 INJECTION, EMULSION INTRAVENOUS at 09:44

## 2024-06-06 ASSESSMENT — PAIN SCALES - GENERAL
PAINLEVEL_OUTOF10: 0
PAINLEVEL_OUTOF10: 2

## 2024-06-06 ASSESSMENT — PAIN - FUNCTIONAL ASSESSMENT: PAIN_FUNCTIONAL_ASSESSMENT: 0-10

## 2024-06-06 NOTE — ANESTHESIA PRE PROCEDURE
ringers IV soln infusion   IntraVENous Continuous Thony Dennis Jr.,  mL/hr at 06/06/24 0854 New Bag at 06/06/24 0854   • sodium chloride flush 0.9 % injection 5-40 mL  5-40 mL IntraVENous 2 times per day Thony Dennis Jr., MD       • sodium chloride flush 0.9 % injection 5-40 mL  5-40 mL IntraVENous PRN Thony Dennis Jr., MD       • 0.9 % sodium chloride infusion   IntraVENous PRN Thony Dennis Jr., MD       • midazolam PF (VERSED) injection 2 mg  2 mg IntraVENous Once PRN hTony Dennis Jr., MD           Allergies:    Allergies   Allergen Reactions   • Other Rash     Medical tape   • Saxenda [Liraglutide -Weight Management] Rash     Raised, red itchy rash around area of injection       Problem List:  There is no problem list on file for this patient.      Past Medical History:        Diagnosis Date   • Ankylosing spondylitis (HCC)     was in process of w/u for this vs SLE.  w/u incomplete.   • Disease of blood and blood forming organ    • Idiopathic intracranial hypertension    • IUD (intrauterine device) in place 07/27/2018    insertion done in [facility].  AVS attached to [employer] msg (5/20/24)   • Migraine    • PCOS (polycystic ovarian syndrome)    • Routine Papanicolaou smear 03/15/2024    normal   • Sleep difficulties    • Von Willebrand disease (HCC)     borderline; negative per patient due to lab workup       Past Surgical History:        Procedure Laterality Date   • WISDOM TOOTH EXTRACTION     • WRIST FRACTURE SURGERY Right        Social History:    Social History     Tobacco Use   • Smoking status: Never   • Smokeless tobacco: Never   Substance Use Topics   • Alcohol use: Not Currently                                Counseling given: Not Answered      Vital Signs (Current):   Vitals:    06/06/24 0832   BP: (!) 147/79   Pulse: (!) 120   Resp: 18   Temp: 98.4 °F (36.9 °C)   TempSrc: Oral   SpO2: 97%   Weight: 100.7 kg (222 lb)

## 2024-06-06 NOTE — PERIOP NOTE
I have reviewed discharge instructions with the  parent-mom.  The  parent-mom verbalized understanding. All questions addressed at this time. A paper copy of these instructions have been given to the patient to take home.  Work note given for 1 week.

## 2024-06-06 NOTE — H&P
Update History & Physical    The patient's History and Physical of June 5, 2024 was reviewed with the patient and I examined the patient. There was no change. The surgical site was confirmed by the patient and me.       Plan: tonsillectomy . ( Pt has been cleared by hematology despite family hx of bleeding disorders )  The risks post op bleeding , infection , tissue regrowth and other issues related to surgery and anesthesia and more as well as the , benefits, expected outcome, and alternative to the recommended procedure have been discussed with the patient. Patient understands and wants to proceed with the procedure.     Electronically signed by Daren Dumont MD on 6/6/2024 at 9:23 AM

## 2024-06-06 NOTE — DISCHARGE INSTRUCTIONS
[facility]      Post Operative Tonsillectomy Instructions      Mild activity is permitted, but no overexertion for the first 2 weeks.    No work at least for 1 week.    Drink plenty of fluids and eat soft foods as tolerated.  Avoid citrus juices, spicy and salty food and sharp pointy foods, such as potato chips and toast.  Warm food or fluids may help.    An ice collar or cold compress to the neck is soothing and may be used if desired.    Fever is expected.  Use Tylenol, Motrin, or a sponge bath to bring down temperature.    White patches will appear where tonsils were - this is normal.    Mouth odor is expected for 2 weeks after surgery.    Try not to leave town for two weeks, so that if you need us we will be available.    Pain medicine may be given on discharge - use as directed and as necessary.  It may be necessary for 7-10 days.    If prescription pain medicine is not given, use Tylenol and Motrin for pain     The greatest concern of bleeding (a 2-4% risk) is over once you are discharged, but bleeding can occur for two weeks.  If bleeding begins at home, do not become excited. If bleeding does not stop within 5-10 mins, call our office and go directly to the Emergency Room.    There may be a persistent change in voice quality.    Office Phone:  [phone]    [facility]’ office hours are 8:00 a.m. to 4:30 p.m.  You should be able to reach us after hours by calling the regular office number.  If for some reason you are not able to reach our “after hours” service through this main number you may call them directly at [phone].

## 2024-06-06 NOTE — ANESTHESIA POSTPROCEDURE EVALUATION
Department of Anesthesiology  Postprocedure Note    Patient: Guerita Seaman  MRN: [id number]  YOB: 2000  Date of evaluation: 6/6/2024    Procedure Summary       Date: 06/06/24 Room / Location: [facility] ASU  / [facility] AMBULATORY OR    Anesthesia Start: 0938 Anesthesia Stop: 1032    Procedure: TONSILLECTOMY (Throat) Diagnosis:       Von Willebrand's disease (HCC)      Chronic tonsillitis and adenoiditis      Hyperplasia of tonsils and adenoids      (Von Willebrand's disease (HCC) [D68.00])      (Chronic tonsillitis and adenoiditis [J35.03])      (Hyperplasia of tonsils and adenoids [J35.3])    Surgeons: Daren Dumont MD Responsible Provider: Thony Dennis Jr., MD    Anesthesia Type: general ASA Status: 2            Anesthesia Type: No value filed.    Marky Phase I: Marky Score: 9    Marky Phase II:      Anesthesia Post Evaluation    Patient location during evaluation: PACU  Patient participation: complete - patient participated  Level of consciousness: awake  Pain score: 2  Airway patency: patent  Nausea & Vomiting: no nausea  Cardiovascular status: blood pressure returned to baseline and hemodynamically stable  Respiratory status: acceptable  Hydration status: stable  Multimodal analgesia pain management approach    No notable events documented.

## 2024-06-06 NOTE — OP NOTE
Patient Name: Guerita Seaman  MRN: [id number]  : 2000  DOS: [unfilled]    OPERATIVE REPORT     Preoperative diagnosis: Chronic tonsillitis    Postoperative diagnosis: Chronic tonsillitis    Operation:   tonsillectomy ([id number])    Surgeon: Daren Dumont MD    Anesthesia: General endotracheal      Estimated blood loss: Minimal    Complications: None.    Condition: Stable to PACU.    Findings: There were large cryptic  tonsils bilaterally    Specimens:  tonsils     Indications for procedure: This a 24 y.o. female who has had recurrent tonsillitis.    Procedure: After informed consent was obtained, the patient was brought to the operating room and placed supine on the table. General endotracheal anesthesia was induced, the table was rotated 90 degrees, and the patient was draped in the usual fashion. A Сергей-Shashi mouth gag was inserted through the oral cavity, retracted, and suspended from the Cruz stand.  A straight Aliss clamp was used to grasp the right tonsil and retract it medially.  Bovie electrocautery was used to dissect the tonsil out of the fossa taking care to the preserve the anterior and posterior pillars. Suction electrocautery was used to control the bleeding.  A similar procedure was performed on the left. On the left some of the cryptic tonsil was up onto the uvula making the dissection slightly onto the uvula proper as well.  There was no visible adenoid tissue , and next, a few ml's of marcaine with epi were injected into each pillar.  The nose, nasopharynx, and oropharynx were then copiously irrigated.  Excellent hemostasis was obtained.  Again, hemostasis was assured by letting down the mouth gag for a period of 30 seconds to look for bleeding.  When none was apparent the mouth gag was removed, and the patient was  turned over to anesthesia and then to be awakened and taken to the recovery room in stable condition.

## 2024-06-11 NOTE — PROGRESS NOTES
[facility]  Metabolic Weight Loss Program        Patient's Name: Guerita Seaman  : 2000    This patient is a participant at [facility] and attended the weekly virtual nutrition class hosted via [employer].      Cheryl Sierra, MS, RD, LDN

## 2024-06-26 ENCOUNTER — OFFICE VISIT (OUTPATIENT)
Age: 24
End: 2024-06-26

## 2024-06-26 DIAGNOSIS — E66.01 CLASS 2 SEVERE OBESITY DUE TO EXCESS CALORIES WITH SERIOUS COMORBIDITY AND BODY MASS INDEX (BMI) OF 39.0 TO 39.9 IN ADULT (HCC): Primary | ICD-10-CM

## 2024-07-03 RX ORDER — MISOPROSTOL 200 UG/1
TABLET ORAL
Qty: 2 TABLET | Refills: 0 | Status: SHIPPED | OUTPATIENT
Start: 2024-07-03

## 2024-07-10 NOTE — PROGRESS NOTES
Guerita Seaman is a 24 y.o. female presents for a problem visit.    Chief Complaint   Patient presents with    IUD replacement             Patient's last menstrual period was 06/14/2024.    Birth Control: IUD.    Last Pap: normal obtained 3/15/24      MIRENA      Device number [id number], expiration date 4/30/26    Chart reviewed for the following:   TRANG EUGENE RN, have reviewed the History, Physical and updated the Allergic reactions for Guerita Seaman     TIME OUT performed immediately prior to start of procedure:   TRANG EUGENE RN, have performed the following reviews on Guerita Seaman prior to the start of the procedure:            * Patient was identified by name and date of birth   * Agreement on procedure being performed was verified  * Risks and Benefits explained to the patient  * Procedure site verified and marked as necessary  * Patient was positioned for comfort  * Consent was signed and verified     Time: 1435      Date of procedure: 7/12/2024    Procedure performed by:  Gaby Rizzo MD       Provider assisted by: Trang Morales RN      Patient assisted by: self    How tolerated by patient: tolerated the procedure well with no complications      Examination chaperoned by TRANG MORALES RN.

## 2024-07-12 ENCOUNTER — PROCEDURE VISIT (OUTPATIENT)
Age: 24
End: 2024-07-12
Payer: COMMERCIAL

## 2024-07-12 VITALS
DIASTOLIC BLOOD PRESSURE: 68 MMHG | BODY MASS INDEX: 39.68 KG/M2 | WEIGHT: 224 LBS | SYSTOLIC BLOOD PRESSURE: 111 MMHG | HEART RATE: 78 BPM

## 2024-07-12 DIAGNOSIS — Z30.433 ENCOUNTER FOR IUD REMOVAL AND REINSERTION: Primary | ICD-10-CM

## 2024-07-12 LAB
HCG, PREGNANCY, URINE, POC: NEGATIVE
VALID INTERNAL CONTROL, POC: YES

## 2024-07-12 PROCEDURE — 58300 INSERT INTRAUTERINE DEVICE: CPT | Performed by: OBSTETRICS & GYNECOLOGY

## 2024-07-12 PROCEDURE — 58301 REMOVE INTRAUTERINE DEVICE: CPT | Performed by: OBSTETRICS & GYNECOLOGY

## 2024-07-12 PROCEDURE — 81025 URINE PREGNANCY TEST: CPT | Performed by: OBSTETRICS & GYNECOLOGY

## 2024-07-12 RX ADMIN — Medication 5 ML: at 14:51

## 2024-07-12 NOTE — PROGRESS NOTES
Mirena.  Placed 2018 in [place].  Records attached to TRADE TO REBATE message from 2024.    Took Cytotec this morning, 400 mcg per vagina.  Took Tylenol just prior to her appointment.            IUD REPLACEMENT    Indications for Removal:  Guerita Seaman is a ,  24 y.o. female White (non-).  Patient's last menstrual period was 2024.  2024.   Presents today for IUD replacement.   Her current Mirena IUD was placed 2018. She has not had  problems with the IUD.  She requests replacement of the IUD because the IUD effectiveness will be expiring. The IUD removal and reinsertion procedure was discussed with the patient and she had no further questions.     Took Cytotec preop      Results for orders placed or performed in visit on 24   AMB POC URINE PREGNANCY TEST, VISUAL COLOR COMPARISON   Result Value Ref Range    Valid Internal Control, POC yes     HCG, Pregnancy, Urine, POC Negative          Procedure:   The patient was placed in a dorsal lithotomy position. On bimanual exam the uterus was anteverted/MP and normal in size with no tenderness present. A speculum exam was performed and the cervix was visualized. The cervix was prepped with zephiran solution.  The cervix was infiltrated with 1% lidocaine, 5 cc in total.  1 cc at 12:00, 2 cc each at 3:00 and 9:00 positions.  The speculum was removed.  Adequate time was allowed for the lidocaine to take effect.    The speculum was then replaced vaginally and the cervix visualized.  The IUD string was visualized. Using ring forceps , the string was grasped and the IUD removed intact. The IUD was shown to the patient.     We then proceeded with insertion of the new IUD.  The anterior lip of the cervix was not grasped with a single toothed tenaculum. The uterus was sounded with a flexible Mirena sound to 6 centimeters. A Mirena IUD was then inserted without difficulty. The string was cut to 3-4 centimeters.    She experienced a

## 2024-08-22 DIAGNOSIS — E66.01 OBESITY, MORBID, BMI 40.0-49.9 (HCC): Primary | ICD-10-CM

## 2024-08-25 DIAGNOSIS — F32.A ANXIETY AND DEPRESSION: ICD-10-CM

## 2024-08-25 DIAGNOSIS — F41.9 ANXIETY AND DEPRESSION: ICD-10-CM

## 2024-08-26 RX ORDER — CITALOPRAM HYDROBROMIDE 20 MG/1
20 TABLET ORAL DAILY
Qty: 90 TABLET | Refills: 0 | Status: SHIPPED | OUTPATIENT
Start: 2024-08-26

## 2024-09-11 RX ORDER — HYDROXYZINE HYDROCHLORIDE 10 MG/1
10 TABLET, FILM COATED ORAL EVERY 8 HOURS PRN
Qty: 30 TABLET | Refills: 1 | Status: SHIPPED | OUTPATIENT
Start: 2024-09-11

## 2024-09-13 ENCOUNTER — OFFICE VISIT (OUTPATIENT)
Age: 24
End: 2024-09-13

## 2024-09-13 VITALS
DIASTOLIC BLOOD PRESSURE: 80 MMHG | SYSTOLIC BLOOD PRESSURE: 116 MMHG | WEIGHT: 228 LBS | BODY MASS INDEX: 40.39 KG/M2 | HEART RATE: 73 BPM

## 2024-09-13 DIAGNOSIS — Z30.431 IUD CHECK UP: Primary | ICD-10-CM

## 2024-09-13 DIAGNOSIS — Z30.431 ENCOUNTER FOR ROUTINE CHECKING OF INTRAUTERINE CONTRACEPTIVE DEVICE (IUD): Primary | ICD-10-CM

## 2024-09-13 SDOH — ECONOMIC STABILITY: FOOD INSECURITY: WITHIN THE PAST 12 MONTHS, YOU WORRIED THAT YOUR FOOD WOULD RUN OUT BEFORE YOU GOT MONEY TO BUY MORE.: NEVER TRUE

## 2024-09-13 SDOH — ECONOMIC STABILITY: FOOD INSECURITY: WITHIN THE PAST 12 MONTHS, THE FOOD YOU BOUGHT JUST DIDN'T LAST AND YOU DIDN'T HAVE MONEY TO GET MORE.: NEVER TRUE

## 2024-09-13 SDOH — ECONOMIC STABILITY: INCOME INSECURITY: HOW HARD IS IT FOR YOU TO PAY FOR THE VERY BASICS LIKE FOOD, HOUSING, MEDICAL CARE, AND HEATING?: NOT HARD AT ALL

## 2024-10-11 NOTE — PROGRESS NOTES
Addended by: CHACHO HONG on: 10/11/2024 09:45 AM     Modules accepted: Orders     [facility]  Metabolic Weight Loss Program        Patient's Name: Guerita Seaman  : 2000    This patient is a participant at [facility] and attended the weekly virtual nutrition class hosted via [employer].      Cheryl Sierra, MS, RD, LDN

## 2024-11-19 ENCOUNTER — OFFICE VISIT (OUTPATIENT)
Age: 24
End: 2024-11-19
Payer: COMMERCIAL

## 2024-11-19 VITALS
HEIGHT: 63 IN | RESPIRATION RATE: 20 BRPM | SYSTOLIC BLOOD PRESSURE: 119 MMHG | WEIGHT: 231.8 LBS | TEMPERATURE: 98.4 F | DIASTOLIC BLOOD PRESSURE: 84 MMHG | OXYGEN SATURATION: 97 % | HEART RATE: 69 BPM | BODY MASS INDEX: 41.07 KG/M2

## 2024-11-19 DIAGNOSIS — R73.9 BLOOD GLUCOSE ELEVATED: ICD-10-CM

## 2024-11-19 DIAGNOSIS — E66.813 CLASS 3 SEVERE OBESITY DUE TO EXCESS CALORIES WITH SERIOUS COMORBIDITY AND BODY MASS INDEX (BMI) OF 40.0 TO 44.9 IN ADULT: Primary | ICD-10-CM

## 2024-11-19 DIAGNOSIS — E66.01 CLASS 3 SEVERE OBESITY DUE TO EXCESS CALORIES WITH SERIOUS COMORBIDITY AND BODY MASS INDEX (BMI) OF 40.0 TO 44.9 IN ADULT: Primary | ICD-10-CM

## 2024-11-19 DIAGNOSIS — E78.00 HYPERCHOLESTEROLEMIA: ICD-10-CM

## 2024-11-19 DIAGNOSIS — T14.90XA TRAUMA IN CHILDHOOD: ICD-10-CM

## 2024-11-19 DIAGNOSIS — G93.2 INTRACRANIAL HYPERTENSION: ICD-10-CM

## 2024-11-19 PROCEDURE — 99214 OFFICE O/P EST MOD 30 MIN: CPT | Performed by: FAMILY MEDICINE

## 2024-11-19 RX ORDER — SEMAGLUTIDE 0.25 MG/.5ML
0.25 INJECTION, SOLUTION SUBCUTANEOUS
Qty: 2 ML | Refills: 0 | Status: SHIPPED | OUTPATIENT
Start: 2024-11-19

## 2024-11-19 ASSESSMENT — PATIENT HEALTH QUESTIONNAIRE - PHQ9
SUM OF ALL RESPONSES TO PHQ QUESTIONS 1-9: 0
SUM OF ALL RESPONSES TO PHQ9 QUESTIONS 1 & 2: 0
SUM OF ALL RESPONSES TO PHQ QUESTIONS 1-9: 0
SUM OF ALL RESPONSES TO PHQ QUESTIONS 1-9: 0
2. FEELING DOWN, DEPRESSED OR HOPELESS: NOT AT ALL
SUM OF ALL RESPONSES TO PHQ QUESTIONS 1-9: 0
1. LITTLE INTEREST OR PLEASURE IN DOING THINGS: NOT AT ALL

## 2024-11-19 NOTE — PROGRESS NOTES
Identified pt with two pt identifiers (name and ). Reviewed chart in preparation for visit and have obtained necessary documentation.    Guerita Seaman is a 24 y.o. female  Chief Complaint   Patient presents with    Weight Management     Restart     /84 (Site: Right Upper Arm, Position: Sitting, Cuff Size: Large Adult)   Pulse 69   Temp 98.4 °F (36.9 °C) (Oral)   Resp 20   Ht 1.6 m (5' 3\")   Wt 105.1 kg (231 lb 12.8 oz)   SpO2 97%   BMI 41.06 kg/m²     1. Have you been to the ER, urgent care clinic since your last visit?  Hospitalized since your last visit?no    2. Have you seen or consulted any other health care providers outside of the [facility] since your last visit?  Include any pap smears or colon screening. No    BMI - 40.7  
Resp 20   Ht 1.6 m (5' 3\")   Wt 105.1 kg (231 lb 12.8 oz)   SpO2 97%   BMI 41.06 kg/m²   No LMP recorded. (Menstrual status: IUD).      Physical Exam  Appearance: well  HEENT:  Scleral icterus?  no  Neck:  Thyromegaly or nodules?  no  Heart:  RRR  Lungs:  clear  Abdomen:     Hepatomegaly? no   Striae present? no  Ext:  no      Encounter Diagnoses   Name Primary?    Class 3 severe obesity due to excess calories with serious comorbidity and body mass index (BMI) of 40.0 to 44.9 in adult Yes    Intracranial hypertension     Trauma in childhood     Blood glucose elevated           Plan  1.  Meds & interval medical history were reviewed  2.  Pt is now en-rolled into program  3.  Obesity class 3     Movement  try to be very consistent with at least 3 days a week of the spinning class or other exercise    Meds  Try wegovy  Meds: She is allergic to saxenda and has an intollerance to non GLP1 agents due to ICH    The patient has attempted to lose weight through our comprehensive weight management program   which  includes reducing caloric intake, exercise 150-300 minutes per week, and improved sleep hours with goal of 8 hrs a night for at least 6 months , and failed.   she has also tried and  failed and  has intollerance to a  non-GLP1 A medication. . she  started with a BMI >30 and has a complication of overweight/obesity. she has no contraindications to use of GLP1 A.   The patient will not be concurrently  taking another GLP1 medication.   Standard of care states:  Candidates for drug therapy include individuals who have not met weight loss goals (loss of at least 5 percent of total body weight at three to six months) with comprehensive lifestyle intervention and have:  ?Obesity and a body mass index (BMI) >=30 kg/m2 or  ?A BMI of 27 to 29.9 kg/m2 plus one or more weight-related comorbidities  Given that BMI is a screening tool, a waist circumference should also be checked. This allows for confirmation of excess body

## 2024-11-20 ENCOUNTER — CLINICAL DOCUMENTATION (OUTPATIENT)
Age: 24
End: 2024-11-20

## 2024-11-20 DIAGNOSIS — E66.813 CLASS 3 SEVERE OBESITY DUE TO EXCESS CALORIES WITH SERIOUS COMORBIDITY AND BODY MASS INDEX (BMI) OF 40.0 TO 44.9 IN ADULT: ICD-10-CM

## 2024-11-20 DIAGNOSIS — R73.9 BLOOD GLUCOSE ELEVATED: ICD-10-CM

## 2024-11-20 DIAGNOSIS — E78.00 HYPERCHOLESTEROLEMIA: ICD-10-CM

## 2024-11-20 DIAGNOSIS — E66.01 CLASS 3 SEVERE OBESITY DUE TO EXCESS CALORIES WITH SERIOUS COMORBIDITY AND BODY MASS INDEX (BMI) OF 40.0 TO 44.9 IN ADULT: ICD-10-CM

## 2024-11-20 NOTE — PROGRESS NOTES
Prior Authorization for Wegovy 0.25 mg/0.5 ml sent to [employer] ([employer] - [employer]) via Epic.    Case ID: [id number]    Awaiting determination.

## 2024-11-20 NOTE — PROGRESS NOTES
Prior Authorization for Wegovy 0.25 mg/0.5 ml APPROVED by [employer] ([employer]) via Epic.    Effective 10/21/2024 through 6/18/2025    Case ID: [id number]    Patient notified via [employer] message.

## 2024-12-03 LAB
ALBUMIN SERPL-MCNC: 4.7 G/DL (ref 4–5)
ALP SERPL-CCNC: 122 IU/L (ref 44–121)
ALT SERPL-CCNC: 19 IU/L (ref 0–32)
AST SERPL-CCNC: 20 IU/L (ref 0–40)
BILIRUB SERPL-MCNC: 0.5 MG/DL (ref 0–1.2)
BUN SERPL-MCNC: 9 MG/DL (ref 6–20)
BUN/CREAT SERPL: 11 (ref 9–23)
CALCIUM SERPL-MCNC: 9.6 MG/DL (ref 8.7–10.2)
CHLORIDE SERPL-SCNC: 105 MMOL/L (ref 96–106)
CHOLEST SERPL-MCNC: 184 MG/DL (ref 100–199)
CO2 SERPL-SCNC: 20 MMOL/L (ref 20–29)
CREAT SERPL-MCNC: 0.79 MG/DL (ref 0.57–1)
EGFRCR SERPLBLD CKD-EPI 2021: 106 ML/MIN/1.73
GLOBULIN SER CALC-MCNC: 2.6 G/DL (ref 1.5–4.5)
GLUCOSE SERPL-MCNC: 86 MG/DL (ref 70–99)
HBA1C MFR BLD: 5.2 % (ref 4.8–5.6)
HDLC SERPL-MCNC: 35 MG/DL
LDLC SERPL CALC-MCNC: 129 MG/DL (ref 0–99)
POTASSIUM SERPL-SCNC: 4 MMOL/L (ref 3.5–5.2)
PROT SERPL-MCNC: 7.3 G/DL (ref 6–8.5)
SODIUM SERPL-SCNC: 139 MMOL/L (ref 134–144)
TRIGL SERPL-MCNC: 107 MG/DL (ref 0–149)
VLDLC SERPL CALC-MCNC: 20 MG/DL (ref 5–40)

## 2024-12-11 DIAGNOSIS — E66.813 CLASS 3 SEVERE OBESITY DUE TO EXCESS CALORIES WITH SERIOUS COMORBIDITY AND BODY MASS INDEX (BMI) OF 40.0 TO 44.9 IN ADULT: Primary | ICD-10-CM

## 2024-12-11 DIAGNOSIS — E66.813 CLASS 3 SEVERE OBESITY DUE TO EXCESS CALORIES WITH SERIOUS COMORBIDITY AND BODY MASS INDEX (BMI) OF 40.0 TO 44.9 IN ADULT: ICD-10-CM

## 2024-12-11 DIAGNOSIS — E66.01 CLASS 3 SEVERE OBESITY DUE TO EXCESS CALORIES WITH SERIOUS COMORBIDITY AND BODY MASS INDEX (BMI) OF 40.0 TO 44.9 IN ADULT: ICD-10-CM

## 2024-12-11 DIAGNOSIS — E66.01 CLASS 3 SEVERE OBESITY DUE TO EXCESS CALORIES WITH SERIOUS COMORBIDITY AND BODY MASS INDEX (BMI) OF 40.0 TO 44.9 IN ADULT: Primary | ICD-10-CM

## 2024-12-11 RX ORDER — SEMAGLUTIDE 0.5 MG/.5ML
0.5 INJECTION, SOLUTION SUBCUTANEOUS
Qty: 2 ML | Refills: 0 | Status: SHIPPED | OUTPATIENT
Start: 2024-12-11

## 2024-12-11 NOTE — TELEPHONE ENCOUNTER
Last Rx on 11/19 for 2 ml    Last seen by Dr. Jarvis on 11/19    Next appointment scheduled for 1/3 - VV

## 2024-12-13 RX ORDER — SEMAGLUTIDE 0.25 MG/.5ML
0.25 INJECTION, SOLUTION SUBCUTANEOUS
OUTPATIENT
Start: 2024-12-13

## 2024-12-18 DIAGNOSIS — G43.709 CHRONIC MIGRAINE W/O AURA, NOT INTRACTABLE, W/O STAT MIGR: ICD-10-CM

## 2024-12-18 DIAGNOSIS — G93.2 PSEUDOTUMOR CEREBRI: ICD-10-CM

## 2024-12-18 RX ORDER — TOPIRAMATE 25 MG/1
TABLET, FILM COATED ORAL
Qty: 360 TABLET | Refills: 1 | OUTPATIENT
Start: 2024-12-18

## 2025-01-02 ENCOUNTER — TELEPHONE (OUTPATIENT)
Age: 25
End: 2025-01-02

## 2025-01-03 ENCOUNTER — TELEMEDICINE (OUTPATIENT)
Age: 25
End: 2025-01-03
Payer: COMMERCIAL

## 2025-01-03 VITALS
BODY MASS INDEX: 40.64 KG/M2 | DIASTOLIC BLOOD PRESSURE: 73 MMHG | WEIGHT: 229.4 LBS | SYSTOLIC BLOOD PRESSURE: 109 MMHG | HEIGHT: 63 IN

## 2025-01-03 DIAGNOSIS — E66.813 CLASS 3 SEVERE OBESITY DUE TO EXCESS CALORIES WITH SERIOUS COMORBIDITY AND BODY MASS INDEX (BMI) OF 40.0 TO 44.9 IN ADULT: Primary | ICD-10-CM

## 2025-01-03 DIAGNOSIS — G93.2 INTRACRANIAL HYPERTENSION: ICD-10-CM

## 2025-01-03 DIAGNOSIS — K21.00 GASTROESOPHAGEAL REFLUX DISEASE WITH ESOPHAGITIS WITHOUT HEMORRHAGE: ICD-10-CM

## 2025-01-03 DIAGNOSIS — E66.01 CLASS 3 SEVERE OBESITY DUE TO EXCESS CALORIES WITH SERIOUS COMORBIDITY AND BODY MASS INDEX (BMI) OF 40.0 TO 44.9 IN ADULT: Primary | ICD-10-CM

## 2025-01-03 PROCEDURE — 99214 OFFICE O/P EST MOD 30 MIN: CPT | Performed by: FAMILY MEDICINE

## 2025-01-03 RX ORDER — SEMAGLUTIDE 1 MG/.5ML
1 INJECTION, SOLUTION SUBCUTANEOUS
Qty: 2 ML | Refills: 0 | Status: SHIPPED | OUTPATIENT
Start: 2025-01-03

## 2025-01-03 ASSESSMENT — PATIENT HEALTH QUESTIONNAIRE - PHQ9
1. LITTLE INTEREST OR PLEASURE IN DOING THINGS: NOT AT ALL
SUM OF ALL RESPONSES TO PHQ QUESTIONS 1-9: 0
2. FEELING DOWN, DEPRESSED OR HOPELESS: NOT AT ALL
SUM OF ALL RESPONSES TO PHQ QUESTIONS 1-9: 0
SUM OF ALL RESPONSES TO PHQ9 QUESTIONS 1 & 2: 0
SUM OF ALL RESPONSES TO PHQ QUESTIONS 1-9: 0
SUM OF ALL RESPONSES TO PHQ QUESTIONS 1-9: 0

## 2025-01-03 NOTE — PROGRESS NOTES
Guerita Seaman is a 24 y.o. female who was seen by synchronous (real-time) audio-video technology on 1/3/2025.      Consent:  She and/or her healthcare decision maker is aware that this patient-initiated Telehealth encounter is a billable service, with coverage as determined by her insurance carrier. She is aware that she may receive a bill and has provided verbal consent to proceed: Yes    I was at home while conducting this encounter.    Guerita Seaman is a 24 y.o. female  who is here for her follow up  Evaluation for the medical bariatric care.    Guerita Seaman, was evaluated through a synchronous (real-time) audio-video encounter. The patient (or guardian if applicable) is aware that this is a billable service, which includes applicable co-pays. This Virtual Visit was conducted with patient's (and/or legal guardian's) consent. Patient identification was verified, and a caregiver was present when appropriate.   The patient was located at Home: [address] [place] [id number]  Provider was located at Home (Appt Dept State): [place]  Confirm you are appropriately licensed, registered, or certified to deliver care in the state where the patient is located as indicated above. If you are not or unsure, please re-schedule the visit: Yes, I confirm.            --Bettina Jarvis MD on 1/3/2025 at 10:07 AM    An electronic signature was used to authenticate this note.   CC: I want to be healthier  She has not met with the dietitian  She has not purchased the meal replacements  She has decided to use the grocery food plan but has not officially started yet  Start 231 last month  Now 229    She is getting reflux with the wegovy  Weight History  Current weight 229( 231) and BMI is Body mass index is 40.64 kg/m².  Goal weight bmi 29,   Highest weight 231   (See weight gain time line scanned into media section of chart)      Hunger control: good      Significant Medical History    Update on sleep apnea and  CPAP no  8    Ever

## 2025-01-03 NOTE — PROGRESS NOTES
Identified pt with two pt identifiers (name and ). Reviewed chart in preparation for visit and have obtained necessary documentation.    Guerita Seaman is a 24 y.o. female  Chief Complaint   Patient presents with    Weight Management     /73   Ht 1.6 m (5' 3\")   Wt 104.1 kg (229 lb 6.4 oz)   BMI 40.64 kg/m²     1. Have you been to the ER, urgent care clinic since your last visit?  Hospitalized since your last visit?no    2. Have you seen or consulted any other health care providers outside of the [facility] since your last visit?  Include any pap smears or colon screening. no

## 2025-01-09 ENCOUNTER — TELEPHONE (OUTPATIENT)
Dept: PRIMARY CARE CLINIC | Facility: CLINIC | Age: 25
End: 2025-01-09

## 2025-01-09 NOTE — TELEPHONE ENCOUNTER
Called and left a VM for the patient to call the office to call the office back about appointment for tomorrow, trying to see if she she would like to switch to a VV   Brief Hospital Summary:         Circumcision:  Hip  rec:    Neurodevelop eval?	  CPR class done?  	  PVS at DC?  Vit D at DC?	  FE at DC?    G6PD screen sent on  ____ . Result ______ . 	    PMD:          Name:  ______________ _             Contact information:  ______________ _  Pharmacy: Name:  ______________ _              Contact information:  ______________ _    Follow-up appointments (list):      [ _ ] Discharge time spent >30 min    [ _ ] Car Seat Challenge lasting 90 min was performed. Today I have reviewed and interpreted the nurses’ records of pulse oximetry, heart rate and respiratory rate and observations during testing period. Car Seat Challenge  passed. The patient is cleared to begin using rear-facing car seat upon discharge. Parents were counseled on rear-facing car seat use.     Brief Hospital Summary:   34 wk , PPROM.  Initial blood cx NG.  Infant had initial hypoglycemia (responded to IVF) and immature thermoregulation (required heated incubator until ).  S/p hyperbilirubinemia of prematurity (Pedro neg), phototherapy discontinued .  Head circumference 8%ile (asymmetric).  W/u included toxo IgG/IgM negative, saliva CMV negative, HUS: WNL.  Due to prematurity, will obtain neurodevelopmental consult.  Initially slow PO feeding, improved prior to discharge.        Circumcision:  Hip US rec:    Neurodevelop eval?	  CPR class done?  	  PVS at DC?  Vit D at DC?	  FE at DC?    G6PD screen sent on  ____ . Result ______ . 	    PMD:          Name:  ______________ _             Contact information:  ______________ _  Pharmacy: Name:  ______________ _              Contact information:  ______________ _    Follow-up appointments (list):      [ _ ] Discharge time spent >30 min    [ _ ] Car Seat Challenge lasting 90 min was performed. Today I have reviewed and interpreted the nurses’ records of pulse oximetry, heart rate and respiratory rate and observations during testing period. Car Seat Challenge  passed. The patient is cleared to begin using rear-facing car seat upon discharge. Parents were counseled on rear-facing car seat use.

## 2025-01-10 ENCOUNTER — HOSPITAL ENCOUNTER (OUTPATIENT)
Facility: HOSPITAL | Age: 25
Discharge: HOME | End: 2025-01-12
Payer: COMMERCIAL

## 2025-01-10 ENCOUNTER — TELEPHONE (OUTPATIENT)
Dept: PRIMARY CARE CLINIC | Facility: CLINIC | Age: 25
End: 2025-01-10

## 2025-01-10 ENCOUNTER — OFFICE VISIT (OUTPATIENT)
Dept: PRIMARY CARE CLINIC | Facility: CLINIC | Age: 25
End: 2025-01-10
Payer: COMMERCIAL

## 2025-01-10 VITALS
RESPIRATION RATE: 18 BRPM | WEIGHT: 231 LBS | TEMPERATURE: 97.8 F | HEIGHT: 63 IN | BODY MASS INDEX: 40.93 KG/M2 | DIASTOLIC BLOOD PRESSURE: 80 MMHG | OXYGEN SATURATION: 98 % | SYSTOLIC BLOOD PRESSURE: 120 MMHG | HEART RATE: 94 BPM

## 2025-01-10 DIAGNOSIS — M79.662 PAIN OF LEFT CALF: ICD-10-CM

## 2025-01-10 DIAGNOSIS — M79.662 PAIN OF LEFT CALF: Primary | ICD-10-CM

## 2025-01-10 DIAGNOSIS — G43.709 CHRONIC MIGRAINE WITHOUT AURA WITHOUT STATUS MIGRAINOSUS, NOT INTRACTABLE: ICD-10-CM

## 2025-01-10 PROCEDURE — 93971 EXTREMITY STUDY: CPT

## 2025-01-10 PROCEDURE — 99213 OFFICE O/P EST LOW 20 MIN: CPT | Performed by: FAMILY MEDICINE

## 2025-01-10 RX ORDER — RIZATRIPTAN BENZOATE 10 MG/1
TABLET ORAL
Qty: 9 TABLET | Refills: 5 | Status: SHIPPED | OUTPATIENT
Start: 2025-01-10

## 2025-01-10 SDOH — ECONOMIC STABILITY: FOOD INSECURITY: WITHIN THE PAST 12 MONTHS, THE FOOD YOU BOUGHT JUST DIDN'T LAST AND YOU DIDN'T HAVE MONEY TO GET MORE.: NEVER TRUE

## 2025-01-10 SDOH — ECONOMIC STABILITY: FOOD INSECURITY: WITHIN THE PAST 12 MONTHS, YOU WORRIED THAT YOUR FOOD WOULD RUN OUT BEFORE YOU GOT MONEY TO BUY MORE.: NEVER TRUE

## 2025-01-10 NOTE — TELEPHONE ENCOUNTER
Called pt and no answer, message left that I am calling to see if we can switch to a VV today and to please call our office back, thank you.

## 2025-01-10 NOTE — PROGRESS NOTES
HPI     Chief Complaint   Patient presents with    Follow-up    Medication Refill    Ankle Pain     Right ankle pain, swelling, feels bruised       History of Present Illness  The patient is a 24-year-old female who is coming in for a follow-up visit.    She has been managing her migraines effectively with Maxalt, which she uses for breakthrough episodes. However, over the past 2 weeks, she has experienced severe headaches. She reports no neurological symptoms such as facial asymmetry or stroke-like symptoms but notes significant blurriness in her vision during these episodes which is typical for her migraines. She has previously tried sumatriptan, prescribed by her neurologist, but discontinued its use due to adverse side effects. She continues to take Topamax daily. She has been managing her migraines effectively with Maxalt, which she uses for breakthrough episodes. She continues to take Topamax daily. During a recent severe episode, she resorted to using Tylenol and Excedrin as she did not have access to Maxalt at that time. The headache subsided the following day.    She is currently under the care of Dr. Jarvis and is on Wegovy for weight management. She received her 1 mg dose yesterday and plans to start it next week. Despite initial weight loss of 2 pounds, she has regained the weight. She has recently incorporated strength training into her exercise regimen. She is on Wegovy for weight management.    She reports experiencing severe pain in her left lower leg, which was initially accompanied by significant swelling. The swelling has since subsided, but the pain persists. She does not report any trauma or injury to the leg and is concerned about the possibility of a blood clot. The pain does not restrict her movement, but she notes mild warmth in the area. This issue has been ongoing for the past week. She reports no chest pain or shortness of breath. She also reports no presence of varicose veins in her

## 2025-01-10 NOTE — PROGRESS NOTES
Health Decision Maker has been checked with the patient      AI form was signed    Chief Complaint   Patient presents with    Follow-up    Medication Refill    Ankle Pain     Right ankle pain, swelling, feels bruised       \"Have you been to the ER, urgent care clinic since your last visit?  Hospitalized since your last visit?\"    NO    “Have you seen or consulted any other health care providers outside of [facility] since your last visit?”    NO      Vitals:    01/10/25 1106   Temp: 97.8 °F (36.6 °C)   TempSrc: Oral   Weight: 104.8 kg (231 lb)   Height: 1.6 m (5' 3\")      Depression: Not at risk (1/3/2025)    PHQ-2     PHQ-2 Score: 0              Click Here for Release of Records Request    Chart reviewed: immunizations are documented.   Immunization History   Administered Date(s) Administered    COVID-19, MODERNA BLUE border, Primary or Immunocompromised, (age 12y+), IM, 100 mcg/0.5mL 03/06/2021, 04/10/2021, 12/31/2021    DTaP, INFANRIX, (age 6w-6y), IM, 0.5mL 2000, 1998, 1998, 08/02/1999, 04/24/2003    Hepatitis A Vaccine 03/19/2008, 03/04/2009    Hepatitis B vaccine 2000, 2000, 2000    Hib vaccine 2000, 2000, 1998, 05/28/1999    MMR, PRIORIX, M-M-R II, (age 12m+), SC, 0.5mL 05/28/1999    Meningococcal ACWY, MENACTRA (MenACWY-D), (age 9m-55y), IM, 0.5mL 03/04/2009    Pneumococcal, PCV-13, PREVNAR 13, (age 6w+), IM, 0.5mL 04/24/2003    Polio Virus Vaccine 2000, 1998, 08/02/1999, 04/24/2003    TDaP, ADACEL (age 10y-64y), BOOSTRIX (age 10y+), IM, 0.5mL 03/04/2009    Varicella, VARIVAX, (age 12m+), SC, 0.5mL 03/01/1999

## 2025-01-10 NOTE — PROGRESS NOTES
HPI     Chief Complaint   Patient presents with    Follow-up    Medication Refill    Ankle Pain     Right ankle pain, swelling, feels bruised       History of Present Illness       Reviewed PmHx, RxHx, FmHx, SocHx, AllgHx and updated and dated in the chart.    Physical Exam:  /80   Pulse 94   Temp 97.8 °F (36.6 °C) (Oral)   Resp 18   Ht 1.6 m (5' 3\")   Wt 104.8 kg (231 lb)   SpO2 98%   BMI 40.92 kg/m²     Physical Exam         Results          No results found for this or any previous visit (from the past 12 hour(s)).}        Assessment / Plan     No diagnosis found.     Assessment & Plan         I have discussed the diagnosis with the patient and the intended plan as seen in the above orders. The patient has received an after-visit summary and questions were answered concerning future plans.  I have discussed medication side effects and warnings with the patient as well.    The patient (or guardian, if applicable) and other individuals in attendance with the patient were advised that Artificial Intelligence will be utilized during this visit to record and process the conversation to generate a clinical note. The patient (or guardian, if applicable) and other individuals in attendance at the appointment consented to the use of AI, including the recording.       Endy Us DO

## 2025-01-11 LAB — ECHO BSA: 2.16 M2

## 2025-01-25 DIAGNOSIS — E66.01 CLASS 3 SEVERE OBESITY DUE TO EXCESS CALORIES WITH SERIOUS COMORBIDITY AND BODY MASS INDEX (BMI) OF 40.0 TO 44.9 IN ADULT: ICD-10-CM

## 2025-01-25 DIAGNOSIS — E66.813 CLASS 3 SEVERE OBESITY DUE TO EXCESS CALORIES WITH SERIOUS COMORBIDITY AND BODY MASS INDEX (BMI) OF 40.0 TO 44.9 IN ADULT: ICD-10-CM

## 2025-01-29 DIAGNOSIS — E66.01 CLASS 3 SEVERE OBESITY DUE TO EXCESS CALORIES WITH SERIOUS COMORBIDITY AND BODY MASS INDEX (BMI) OF 40.0 TO 44.9 IN ADULT: ICD-10-CM

## 2025-01-29 DIAGNOSIS — E66.813 CLASS 3 SEVERE OBESITY DUE TO EXCESS CALORIES WITH SERIOUS COMORBIDITY AND BODY MASS INDEX (BMI) OF 40.0 TO 44.9 IN ADULT: ICD-10-CM

## 2025-01-29 RX ORDER — SEMAGLUTIDE 1 MG/.5ML
1 INJECTION, SOLUTION SUBCUTANEOUS
Qty: 2 ML | Refills: 0 | OUTPATIENT
Start: 2025-01-29

## 2025-01-29 RX ORDER — SEMAGLUTIDE 1 MG/.5ML
1 INJECTION, SOLUTION SUBCUTANEOUS
OUTPATIENT
Start: 2025-01-29

## 2025-01-29 NOTE — TELEPHONE ENCOUNTER
Dr. Jarvis requested the patient to be called regarding the following:  She needs to see dietitian and get proper eating plan. It is not safe to take the medication and not have proper eating plan.    Pt was called and verified using 2 identifiers and informed her above information. She states that her insurance does not cover Dietician visits.  She states that she saw the dietician last year and she is following her recommendations that were given. She states that she will take her last Wegovy 1 mg injection today and she will be out of the medication.   Pt is requesting to have a refill sent to her pharmacy. Advised her to call the office to schedule an appointment with Dr. Jarvis  for her monthly follow up. She will also call the office back to schedule an appointment with Dr. Jarvis.    Will forward this information to Dr. Jarvis.

## 2025-01-30 DIAGNOSIS — E66.01 CLASS 3 SEVERE OBESITY DUE TO EXCESS CALORIES WITH SERIOUS COMORBIDITY AND BODY MASS INDEX (BMI) OF 40.0 TO 44.9 IN ADULT: ICD-10-CM

## 2025-01-30 DIAGNOSIS — E66.813 CLASS 3 SEVERE OBESITY DUE TO EXCESS CALORIES WITH SERIOUS COMORBIDITY AND BODY MASS INDEX (BMI) OF 40.0 TO 44.9 IN ADULT: ICD-10-CM

## 2025-01-30 RX ORDER — SEMAGLUTIDE 1 MG/.5ML
1 INJECTION, SOLUTION SUBCUTANEOUS
Qty: 2 ML | Refills: 0 | Status: SHIPPED | OUTPATIENT
Start: 2025-01-30

## 2025-02-07 ENCOUNTER — TELEMEDICINE (OUTPATIENT)
Age: 25
End: 2025-02-07
Payer: COMMERCIAL

## 2025-02-07 ENCOUNTER — TELEPHONE (OUTPATIENT)
Age: 25
End: 2025-02-07

## 2025-02-07 VITALS
HEART RATE: 79 BPM | BODY MASS INDEX: 39.69 KG/M2 | HEIGHT: 63 IN | DIASTOLIC BLOOD PRESSURE: 79 MMHG | SYSTOLIC BLOOD PRESSURE: 106 MMHG | WEIGHT: 224 LBS

## 2025-02-07 DIAGNOSIS — E66.01 CLASS 3 SEVERE OBESITY DUE TO EXCESS CALORIES WITH SERIOUS COMORBIDITY AND BODY MASS INDEX (BMI) OF 40.0 TO 44.9 IN ADULT: Primary | ICD-10-CM

## 2025-02-07 DIAGNOSIS — T14.90XA TRAUMA IN CHILDHOOD: ICD-10-CM

## 2025-02-07 DIAGNOSIS — G93.2 INTRACRANIAL HYPERTENSION: ICD-10-CM

## 2025-02-07 DIAGNOSIS — R73.9 BLOOD GLUCOSE ELEVATED: ICD-10-CM

## 2025-02-07 DIAGNOSIS — E66.813 CLASS 3 SEVERE OBESITY DUE TO EXCESS CALORIES WITH SERIOUS COMORBIDITY AND BODY MASS INDEX (BMI) OF 40.0 TO 44.9 IN ADULT: Primary | ICD-10-CM

## 2025-02-07 DIAGNOSIS — E78.00 HYPERCHOLESTEROLEMIA: ICD-10-CM

## 2025-02-07 DIAGNOSIS — K21.00 GASTROESOPHAGEAL REFLUX DISEASE WITH ESOPHAGITIS WITHOUT HEMORRHAGE: ICD-10-CM

## 2025-02-07 PROCEDURE — 99214 OFFICE O/P EST MOD 30 MIN: CPT | Performed by: FAMILY MEDICINE

## 2025-02-07 RX ORDER — SEMAGLUTIDE 1 MG/.5ML
1 INJECTION, SOLUTION SUBCUTANEOUS
Qty: 2 ML | Refills: 0 | Status: SHIPPED | OUTPATIENT
Start: 2025-02-07

## 2025-02-07 ASSESSMENT — PATIENT HEALTH QUESTIONNAIRE - PHQ9
SUM OF ALL RESPONSES TO PHQ QUESTIONS 1-9: 0
SUM OF ALL RESPONSES TO PHQ QUESTIONS 1-9: 0
9. THOUGHTS THAT YOU WOULD BE BETTER OFF DEAD, OR OF HURTING YOURSELF: NOT AT ALL
3. TROUBLE FALLING OR STAYING ASLEEP: NOT AT ALL
7. TROUBLE CONCENTRATING ON THINGS, SUCH AS READING THE NEWSPAPER OR WATCHING TELEVISION: NOT AT ALL
8. MOVING OR SPEAKING SO SLOWLY THAT OTHER PEOPLE COULD HAVE NOTICED. OR THE OPPOSITE, BEING SO FIGETY OR RESTLESS THAT YOU HAVE BEEN MOVING AROUND A LOT MORE THAN USUAL: NOT AT ALL
SUM OF ALL RESPONSES TO PHQ QUESTIONS 1-9: 0
SUM OF ALL RESPONSES TO PHQ QUESTIONS 1-9: 0
SUM OF ALL RESPONSES TO PHQ9 QUESTIONS 1 & 2: 0
1. LITTLE INTEREST OR PLEASURE IN DOING THINGS: NOT AT ALL
5. POOR APPETITE OR OVEREATING: NOT AT ALL
6. FEELING BAD ABOUT YOURSELF - OR THAT YOU ARE A FAILURE OR HAVE LET YOURSELF OR YOUR FAMILY DOWN: NOT AT ALL
4. FEELING TIRED OR HAVING LITTLE ENERGY: NOT AT ALL
2. FEELING DOWN, DEPRESSED OR HOPELESS: NOT AT ALL

## 2025-02-07 NOTE — PROGRESS NOTES
Identified pt with two pt identifiers (name and ). Reviewed chart in preparation for visit and have obtained necessary documentation.    Guerita Seaman is a 24 y.o. female  Chief Complaint   Patient presents with    Weight Management     1 month      /79   Pulse 79   Ht 1.6 m (5' 3\")   Wt 101.6 kg (224 lb)   BMI 39.68 kg/m²     1. Have you been to the ER, urgent care clinic since your last visit?  Hospitalized since your last visit?no    2. Have you seen or consulted any other health care providers outside of the [facility] since your last visit?  Include any pap smears or colon screening. No    Patient attended triage but did not bring homework form. Patient instructed to email or fax completed homework form to us. Patient informed that not bringing the homework form can result in not being seen next time.

## 2025-02-07 NOTE — PROGRESS NOTES
Guerita Seaman is a 24 y.o. female who was seen by synchronous (real-time) audio-video technology on 2/7/2025.      Consent:  She and/or her healthcare decision maker is aware that this patient-initiated Telehealth encounter is a billable service, with coverage as determined by her insurance carrier. She is aware that she may receive a bill and has provided verbal consent to proceed: Yes    I was at home while conducting this encounter.    Guerita Seaman is a 24 y.o. female  who is here for her follow up  Evaluation for the medical bariatric care.    Guerita Seaman, was evaluated through a synchronous (real-time) audio-video encounter. The patient (or guardian if applicable) is aware that this is a billable service, which includes applicable co-pays. This Virtual Visit was conducted with patient's (and/or legal guardian's) consent. Patient identification was verified, and a caregiver was present when appropriate.   The patient was located at Home: [address] [place] [id number]  Provider was located at Home [place]Appt Dept State[place]  Confirm you are appropriately licensed, registered, or certified to deliver care in the state where the patient is located as indicated above. If you are not or unsure, please re-schedule the visit: Yes, I confirm.            --Bettina Jarvis MD on 2/7/2025 at 9:19 AM    An electronic signature was used to authenticate this note.   CC: I want to be healthier  Her insurance does not cover any dietitian, she met with our dietitian once a month last year when she had different insurance  Her insurance does not cover therapy  They do cover wegovy  Her side effects from the 1 mg dose wegovy have resolved  Weight History  Current weight 224( 229)  and BMI is Body mass index is 39.68 kg/m².  Goal weight BMI 29,   Highest weight 231   (See weight gain time line scanned into media section of chart)      Hunger control: good      Significant Medical History    Update on sleep apnea and  CPAP

## 2025-02-11 ENCOUNTER — TELEMEDICINE (OUTPATIENT)
Age: 25
End: 2025-02-11
Payer: COMMERCIAL

## 2025-02-11 DIAGNOSIS — G93.2 PSEUDOTUMOR CEREBRI: ICD-10-CM

## 2025-02-11 DIAGNOSIS — G44.86 CERVICOGENIC HEADACHE: ICD-10-CM

## 2025-02-11 DIAGNOSIS — G43.709 CHRONIC MIGRAINE W/O AURA, NOT INTRACTABLE, W/O STAT MIGR: Primary | ICD-10-CM

## 2025-02-11 DIAGNOSIS — M54.81 OCCIPITAL NEURALGIA OF RIGHT SIDE: ICD-10-CM

## 2025-02-11 PROCEDURE — 99214 OFFICE O/P EST MOD 30 MIN: CPT | Performed by: PSYCHIATRY & NEUROLOGY

## 2025-02-11 RX ORDER — TOPIRAMATE 25 MG/1
50 TABLET, FILM COATED ORAL 2 TIMES DAILY
Qty: 120 TABLET | Refills: 5 | Status: SHIPPED | OUTPATIENT
Start: 2025-02-11

## 2025-02-11 NOTE — PROGRESS NOTES
2025    TELEHEALTH EVALUATION -- Audio/Visual    HPI:    Guerita Seaman (:  2000) has requested an audio/video evaluation for the following concern(s):    Chief Complaint   Patient presents with    Migraine     Follow up- patient reports she has had 6 migraines in the last 30 days.     Since the last visit on 2024, patient reports she was doing great from a headache and migraine standpoint only having 1 minor headache every couple of months and no really severe migraine headaches.  However for the last 1-1/2 months patient has been having 1-2 migraine headaches per week.  Unclear as to the increase in frequency.  Rizatriptan 10 mg works as abortive therapy.  Sumatriptan 100 mg caused side effects.  She is currently taking topiramate 50 mg at bedtime with no side effects.  She reports no changes in her vision.  She is aware of her posture and tries to correct it.      Review of Systems Per history    Prior to Visit Medications    Medication Sig Taking? Authorizing Provider   topiramate (TOPAMAX) 25 MG tablet Take 2 tablets by mouth 2 times daily Take 1 tab BID x 2 weeks; then 2 tabs BID Yes Luke Obrien Jr., MD   Semaglutide-Weight Management (WEGOVY) 1 MG/0.5ML SOAJ SC injection Inject 1 mg into the skin every 7 days Yes Bettina Jarvis MD   rizatriptan (MAXALT) 10 MG tablet Take at onset of migraine. May repeat dose in 2 hours if needed. Do not take more then 2 tabs in 24 hours. Yes Aggie Us, DO   Levonorgestrel (MIRENA, 52 MG, IU) by IntraUTERine route PLACED 24 Yes Provider, MD Cristobal   hydrOXYzine HCl (ATARAX) 10 MG tablet Take 1 tablet by mouth every 8 hours as needed for Anxiety Yes Aggie Us DO   ondansetron (ZOFRAN-ODT) 8 MG TBDP disintegrating tablet Place 1 tablet under the tongue every 8 hours as needed for Nausea or Vomiting Yes Daren Dumont MD   meclizine (ANTIVERT) 25 MG tablet TAKE 1 TABLET BY MOUTH THREE TIMES A DAY AS NEEDED FOR DIZZINESS

## 2025-02-13 NOTE — PROGRESS NOTES
[facility]  Metabolic Program Follow-up Nutrition Consult    Date: 2024   Physician: Bettina Jarvis MD  Name: Guerita Seaman  :  2000    Type of Plan: LCD  Weeks on Plan: 2 months  Virtual visit was completed through Zoom.    ASSESSMENT:    Medications/Supplements:   Prior to Admission medications    Medication Sig Start Date End Date Taking? Authorizing Provider   acetaZOLAMIDE (DIAMOX) 250 MG tablet TAKE 1 TABLET BY MOUTH TWICE A DAY  Patient taking differently: Take 1 tablet by mouth daily 24   Luke Obrien Jr., MD   citalopram (CELEXA) 20 MG tablet Take 1 tablet by mouth daily 23   Aggie Us DO   SUMAtriptan (IMITREX) 100 MG tablet Take 1 tab at onset of severe headache; may repeat another in 2 hours if headaches persist 10/10/23   Lkue Obrien Jr., MD   meclizine (ANTIVERT) 25 MG tablet TAKE 1 TABLET BY MOUTH THREE TIMES A DAY AS NEEDED FOR DIZZINESS 23   Provider, MD Cristobal   butalbital-acetaminophen-caffeine (FIORICET, ESGIC) -40 MG per tablet TAKE 1 TABLET BY MOUTH EVERY 8 HOURS AS NEEDED FOR HEADACHE 23   ProviderCristobal MD   levonorgestrel (MIRENA, 52 MG,) IUD 52 mg 1 Device by IntraUTERine route once    Automatic Reconciliation, Ar              Starting Weight: 241#  Current Weight: 227#  Overall Pounds Lost: 14# Overall Pounds Gained: 0    Exercise/Physical Activity:walk 30 minutes a day during the week, On weekends 5 mile - 7 mile hikes.    Is patient using New Directions products:yes  If yes, how many per day:2    Aversions/side effects of product/program:none    Fluids used to mix with products:water    Reported Diet History:  Jessa ced to scan barcodes to determine if she should eat it. Fasting ced to determine when she should/n't eat.  1 ND shake a day.  Low fat greek yogurt, flax seeds, strawberries, bagel with shake some mornings.    More fruit and vegetables.    Dinners: chicken most often.  No red  How Severe Is It?: moderate Is This A New Presentation, Or A Follow-Up?: Follow Up Seborrheic Dermatitis

## 2025-02-17 ENCOUNTER — TELEPHONE (OUTPATIENT)
Age: 25
End: 2025-02-17

## 2025-02-17 NOTE — TELEPHONE ENCOUNTER
Reached out to the patient to schedule a 4 month follow up. LVM to call and schedule.     If the patient calls back please schedule.

## 2025-03-26 ENCOUNTER — OFFICE VISIT (OUTPATIENT)
Dept: PRIMARY CARE CLINIC | Facility: CLINIC | Age: 25
End: 2025-03-26
Payer: COMMERCIAL

## 2025-03-26 VITALS
OXYGEN SATURATION: 100 % | DIASTOLIC BLOOD PRESSURE: 74 MMHG | WEIGHT: 220 LBS | BODY MASS INDEX: 38.97 KG/M2 | SYSTOLIC BLOOD PRESSURE: 120 MMHG | HEART RATE: 95 BPM

## 2025-03-26 DIAGNOSIS — G93.2 IDIOPATHIC INTRACRANIAL HYPERTENSION: ICD-10-CM

## 2025-03-26 DIAGNOSIS — F32.A ANXIETY AND DEPRESSION: ICD-10-CM

## 2025-03-26 DIAGNOSIS — J30.89 ENVIRONMENTAL AND SEASONAL ALLERGIES: ICD-10-CM

## 2025-03-26 DIAGNOSIS — Z92.89 HISTORY OF SLEEP STUDY: ICD-10-CM

## 2025-03-26 DIAGNOSIS — F41.9 ANXIETY AND DEPRESSION: ICD-10-CM

## 2025-03-26 DIAGNOSIS — R42 DIZZINESS: Primary | ICD-10-CM

## 2025-03-26 PROCEDURE — 99214 OFFICE O/P EST MOD 30 MIN: CPT | Performed by: FAMILY MEDICINE

## 2025-03-26 NOTE — PROGRESS NOTES
HPI     Chief Complaint   Patient presents with    Weight Management     Would like to switch wegovy prescription to Dr. Us.    Dizziness     Has had this before, has come back in the past month.     Fatigue     Fatigue, sore throat, nasal congestion, and headache.     Other     Body odor       History of Present Illness  The patient is a 25-year-old female presenting with dizziness, weight management, acid reflux, and stress.    Persistent dizziness for the past month, characterized by spinning and imbalance, especially when looking upwards or to the left at a 45-degree angle. Symptoms began approximately one year ago, with a similar episode lasting three months last year. Possible link to seasonal allergens affecting intracranial pressure she suspects. Childhood allergy testing conducted, no recent tests. Consulted her neurologist. Last years MRI scan consistent with diminished idiopathic intracranial hypertension but no acute finding. Underwent vestibular physical therapy last year, and has been on Diamox since November without significant relief. Meclizine tried for three months without improvement.    Currently on Wegovy for weight management, causing gastrointestinal disturbances and mood changes. Lost approximately 15 pounds since starting the medication. Considering discontinuation due to side effects or switching providers.    Severe acid reflux not improved with wedge pillow. Seeking advice on alternative treatments like Gaviscon or Tums.    Occasional anxiety and depression, perceived as more severe than usual. No thoughts of self-harm. Interested in exploring counseling services for stress management.    Tonsils removed in June 2024, follow-up 3 months later. Ears fine. No longer snoring at night. Mouth breather. Sleep apnea checked with 4 tests; 3 good, 1 borderline.    PAST SURGICAL HISTORY:   - Tonsillectomy in 06/2024     Reviewed PmHx, RxHx, FmHx, SocHx, AllgHx and updated and dated in

## 2025-03-26 NOTE — PROGRESS NOTES
Health Decision Maker has been checked with the patient          AI form was signed    Chief Complaint   Patient presents with    Weight Management     Would like to switch wegovy prescription to Dr. Us.    Dizziness     Has had this before, has come back in the past month.     Fatigue     Fatigue, sore throat, nasal congestion, and headache.     Other     Body odor       \"Have you been to the ER, urgent care clinic since your last visit?  Hospitalized since your last visit?\"    NO    “Have you seen or consulted any other health care providers [facility] since your last visit?”    NO      Vitals:    03/26/25 1225   BP: 120/74   Patient Position: Sitting   Pulse: 95   SpO2: 100%   Weight: 99.8 kg (220 lb)      Depression: Not at risk (2/7/2025)    PHQ-2     PHQ-2 Score: 0              Click Here for Release of Records Request        Chart reviewed: immunizations are documented.   Immunization History   Administered Date(s) Administered    COVID-19, MODERNA BLUE border, Primary or Immunocompromised, (age 12y+), IM, 100 mcg/0.5mL 03/06/2021, 04/10/2021, 12/31/2021    DTaP, INFANRIX, (age 6w-6y), IM, 0.5mL 1998, 1998, 1998, 08/02/1999, 04/24/2003    Hepatitis A Vaccine 03/19/2008, 03/04/2009    Hepatitis B vaccine 2000, 1998, 1998    Hib vaccine 2000, 1998, 1998, 05/28/1999    MMR, PRIORIX, M-M-R II, (age 12m+), SC, 0.5mL 05/28/1999    Meningococcal ACWY, MENACTRA (MenACWY-D), (age 9m-55y), IM, 0.5mL 03/04/2009    Pneumococcal, PCV-13, PREVNAR 13, (age 6w+), IM, 0.5mL 04/24/2003    Polio Virus Vaccine 1998, 1998, 08/02/1999, 04/24/2003    TDaP, ADACEL (age 10y-64y), BOOSTRIX (age 10y+), IM, 0.5mL 03/04/2009    Varicella, VARIVAX, (age 12m+), SC, 0.5mL 03/01/1999

## 2025-03-31 DIAGNOSIS — E66.813 CLASS 3 SEVERE OBESITY DUE TO EXCESS CALORIES WITH SERIOUS COMORBIDITY AND BODY MASS INDEX (BMI) OF 40.0 TO 44.9 IN ADULT (HCC): ICD-10-CM

## 2025-03-31 RX ORDER — SEMAGLUTIDE 1 MG/.5ML
1 INJECTION, SOLUTION SUBCUTANEOUS
Qty: 2 ML | Refills: 0 | OUTPATIENT
Start: 2025-03-31

## 2025-03-31 RX ORDER — SEMAGLUTIDE 1 MG/.5ML
1 INJECTION, SOLUTION SUBCUTANEOUS
OUTPATIENT
Start: 2025-03-31

## 2025-03-31 NOTE — TELEPHONE ENCOUNTER
Last Rx on 2/7 for 2 ml with 0 RF    Last seen by Dr. Jarvis on 2/7      Next appointment scheduled for 4/22

## 2025-04-04 DIAGNOSIS — E66.01 CLASS 3 SEVERE OBESITY DUE TO EXCESS CALORIES WITH SERIOUS COMORBIDITY AND BODY MASS INDEX (BMI) OF 40.0 TO 44.9 IN ADULT: ICD-10-CM

## 2025-04-04 DIAGNOSIS — E66.813 CLASS 3 SEVERE OBESITY DUE TO EXCESS CALORIES WITH SERIOUS COMORBIDITY AND BODY MASS INDEX (BMI) OF 40.0 TO 44.9 IN ADULT: ICD-10-CM

## 2025-04-04 NOTE — TELEPHONE ENCOUNTER
Patient called to check status of refill for Wegovy. Patient is out of medication; explained request sent to Dr Jarvis

## 2025-04-05 DIAGNOSIS — E66.813 CLASS 3 SEVERE OBESITY DUE TO EXCESS CALORIES WITH SERIOUS COMORBIDITY AND BODY MASS INDEX (BMI) OF 40.0 TO 44.9 IN ADULT: ICD-10-CM

## 2025-04-05 DIAGNOSIS — E66.01 CLASS 3 SEVERE OBESITY DUE TO EXCESS CALORIES WITH SERIOUS COMORBIDITY AND BODY MASS INDEX (BMI) OF 40.0 TO 44.9 IN ADULT: ICD-10-CM

## 2025-04-05 RX ORDER — SEMAGLUTIDE 1 MG/.5ML
1 INJECTION, SOLUTION SUBCUTANEOUS
Qty: 2 ML | Refills: 0 | Status: SHIPPED | OUTPATIENT
Start: 2025-04-05

## 2025-04-05 RX ORDER — SEMAGLUTIDE 1 MG/.5ML
1 INJECTION, SOLUTION SUBCUTANEOUS
OUTPATIENT
Start: 2025-04-05

## 2025-04-09 ENCOUNTER — CLINICAL DOCUMENTATION (OUTPATIENT)
Age: 25
End: 2025-04-09

## 2025-04-09 ENCOUNTER — CLINICAL SUPPORT (OUTPATIENT)
Age: 25
End: 2025-04-09

## 2025-04-09 VITALS
HEART RATE: 93 BPM | OXYGEN SATURATION: 97 % | SYSTOLIC BLOOD PRESSURE: 116 MMHG | TEMPERATURE: 97.8 F | WEIGHT: 221 LBS | DIASTOLIC BLOOD PRESSURE: 79 MMHG | RESPIRATION RATE: 16 BRPM | HEIGHT: 63 IN | BODY MASS INDEX: 39.16 KG/M2

## 2025-04-09 DIAGNOSIS — E66.01 CLASS 2 SEVERE OBESITY DUE TO EXCESS CALORIES WITH SERIOUS COMORBIDITY AND BODY MASS INDEX (BMI) OF 39.0 TO 39.9 IN ADULT: Primary | ICD-10-CM

## 2025-04-09 DIAGNOSIS — R73.9 BLOOD GLUCOSE ELEVATED: ICD-10-CM

## 2025-04-09 DIAGNOSIS — E66.812 CLASS 2 SEVERE OBESITY DUE TO EXCESS CALORIES WITH SERIOUS COMORBIDITY AND BODY MASS INDEX (BMI) OF 39.0 TO 39.9 IN ADULT: Primary | ICD-10-CM

## 2025-04-09 DIAGNOSIS — E78.00 HYPERCHOLESTEROLEMIA: ICD-10-CM

## 2025-04-09 DIAGNOSIS — Z62.819 TRAUMA IN CHILDHOOD: ICD-10-CM

## 2025-04-09 DIAGNOSIS — G93.2 INTRACRANIAL HYPERTENSION: ICD-10-CM

## 2025-04-09 ASSESSMENT — PATIENT HEALTH QUESTIONNAIRE - PHQ9
SUM OF ALL RESPONSES TO PHQ QUESTIONS 1-9: 0
1. LITTLE INTEREST OR PLEASURE IN DOING THINGS: NOT AT ALL
SUM OF ALL RESPONSES TO PHQ QUESTIONS 1-9: 0
SUM OF ALL RESPONSES TO PHQ QUESTIONS 1-9: 0
2. FEELING DOWN, DEPRESSED OR HOPELESS: NOT AT ALL
SUM OF ALL RESPONSES TO PHQ QUESTIONS 1-9: 0

## 2025-04-09 NOTE — PROGRESS NOTES
Identified pt with two pt identifiers (name and ). Reviewed chart in preparation for visit and have obtained necessary documentation.    Guerita Seaman is a 25 y.o. female  Chief Complaint   Patient presents with    Weight Management     /79 (BP Site: Right Upper Arm, Patient Position: Sitting, BP Cuff Size: Large Adult)   Pulse 93   Temp 97.8 °F (36.6 °C) (Oral)   Resp 16   Ht 1.6 m (5' 3\")   Wt 100.2 kg (221 lb)   SpO2 97%   BMI 39.15 kg/m²     1. Have you been to the ER, urgent care clinic since your last visit?  Hospitalized since your last visit?no    2. Have you seen or consulted any other health care providers outside of the [facility] since your last visit?  Include any pap smears or colon screening. No    Patient attended triage but did not bring homework form. Patient instructed to email or fax completed homework form to us. Patient informed that not bringing the homework form can result in not being seen next time.

## 2025-04-09 NOTE — PROGRESS NOTES
Prior Authorization for Wegovy 1 mg/0.5 ml sent to [facility] via Cover Pay with a Tweets.    Key # [id number]    Awaiting determination.

## 2025-04-15 NOTE — PROGRESS NOTES
Nurse note from patient's weekly   class was reviewed.  Pertinent medical concerns were:   reviewed   On grocery food plan  BP Readings from Last 3 Encounters:   04/09/25 116/79   03/26/25 120/74   02/07/25 106/79       Failed to redirect to the [facility] the [facility].    Current Outpatient Medications   Medication Sig Dispense Refill    Semaglutide-Weight Management (WEGOVY) 1 MG/0.5ML SOAJ SC injection Inject 1 mg into the skin every 7 days 2 mL 0    topiramate (TOPAMAX) 25 MG tablet Take 2 tablets by mouth 2 times daily Take 1 tab BID x 2 weeks; then 2 tabs  tablet 5    rizatriptan (MAXALT) 10 MG tablet Take at onset of migraine. May repeat dose in 2 hours if needed. Do not take more then 2 tabs in 24 hours. 9 tablet 5    Levonorgestrel (MIRENA, 52 MG, IU) by IntraUTERine route PLACED 7/12/24      hydrOXYzine HCl (ATARAX) 10 MG tablet Take 1 tablet by mouth every 8 hours as needed for Anxiety 30 tablet 1    ondansetron (ZOFRAN-ODT) 8 MG TBDP disintegrating tablet Place 1 tablet under the tongue every 8 hours as needed for Nausea or Vomiting 8 tablet 1    meclizine (ANTIVERT) 25 MG tablet TAKE 1 TABLET BY MOUTH THREE TIMES A DAY AS NEEDED FOR DIZZINESS      butalbital-acetaminophen-caffeine (FIORICET, ESGIC) -40 MG per tablet TAKE 1 TABLET BY MOUTH EVERY 8 HOURS AS NEEDED FOR HEADACHE       No current facility-administered medications for this visit.

## 2025-04-21 ENCOUNTER — TELEPHONE (OUTPATIENT)
Age: 25
End: 2025-04-21

## 2025-04-22 ENCOUNTER — OFFICE VISIT (OUTPATIENT)
Age: 25
End: 2025-04-22
Payer: COMMERCIAL

## 2025-04-22 VITALS
OXYGEN SATURATION: 97 % | HEIGHT: 63 IN | HEART RATE: 72 BPM | RESPIRATION RATE: 16 BRPM | WEIGHT: 219.6 LBS | BODY MASS INDEX: 38.91 KG/M2 | SYSTOLIC BLOOD PRESSURE: 106 MMHG | DIASTOLIC BLOOD PRESSURE: 74 MMHG | TEMPERATURE: 98.3 F

## 2025-04-22 DIAGNOSIS — E66.812 CLASS 2 SEVERE OBESITY DUE TO EXCESS CALORIES WITH SERIOUS COMORBIDITY AND BODY MASS INDEX (BMI) OF 38.0 TO 38.9 IN ADULT (HCC): Primary | ICD-10-CM

## 2025-04-22 DIAGNOSIS — E66.01 CLASS 2 SEVERE OBESITY DUE TO EXCESS CALORIES WITH SERIOUS COMORBIDITY AND BODY MASS INDEX (BMI) OF 38.0 TO 38.9 IN ADULT (HCC): Primary | ICD-10-CM

## 2025-04-22 DIAGNOSIS — G93.2 INTRACRANIAL HYPERTENSION: ICD-10-CM

## 2025-04-22 DIAGNOSIS — E28.2 PCOS (POLYCYSTIC OVARIAN SYNDROME): ICD-10-CM

## 2025-04-22 DIAGNOSIS — Z62.819 TRAUMA IN CHILDHOOD: ICD-10-CM

## 2025-04-22 DIAGNOSIS — E66.813 CLASS 3 SEVERE OBESITY DUE TO EXCESS CALORIES WITH SERIOUS COMORBIDITY AND BODY MASS INDEX (BMI) OF 40.0 TO 44.9 IN ADULT (HCC): ICD-10-CM

## 2025-04-22 DIAGNOSIS — E78.00 HYPERCHOLESTEROLEMIA: ICD-10-CM

## 2025-04-22 DIAGNOSIS — R73.9 BLOOD GLUCOSE ELEVATED: ICD-10-CM

## 2025-04-22 PROCEDURE — 99214 OFFICE O/P EST MOD 30 MIN: CPT | Performed by: FAMILY MEDICINE

## 2025-04-22 RX ORDER — SEMAGLUTIDE 1 MG/.5ML
1 INJECTION, SOLUTION SUBCUTANEOUS
Qty: 2 ML | Refills: 1 | Status: SHIPPED | OUTPATIENT
Start: 2025-04-22

## 2025-04-22 ASSESSMENT — PATIENT HEALTH QUESTIONNAIRE - PHQ9
SUM OF ALL RESPONSES TO PHQ QUESTIONS 1-9: 0
2. FEELING DOWN, DEPRESSED OR HOPELESS: NOT AT ALL
SUM OF ALL RESPONSES TO PHQ QUESTIONS 1-9: 0
SUM OF ALL RESPONSES TO PHQ QUESTIONS 1-9: 0
1. LITTLE INTEREST OR PLEASURE IN DOING THINGS: NOT AT ALL
SUM OF ALL RESPONSES TO PHQ QUESTIONS 1-9: 0

## 2025-04-22 NOTE — PROGRESS NOTES
Weight Loss Progress Note: follow up Physician Visit      Guerita Seaman is a 25 y.o. female  who is here for her follow up  Evaluation for the medical bariatric care.      CC: I want to be healthier  Taking 1 mg wegovy  Lost another 5 lbs  She does not eat breakfast, gets up late  Has chicken about 10-11 am  Has lunch 2-3 pm leftovers from sunday  Dinner a snack like veggies,      Weight History  Current weight 219( 224)  and BMI is Body mass index is 38.9 kg/m².  Goal weight BMI 29,   Highest weight 231   (See weight gain time line scanned into media section of chart)  Down 12 lbs  Hunger control: good     Significant Medical History    Update on sleep apnea and  CPAP 8 hrs, not refreshed    Ever had bariatric surgery: no    Pregnant or planning on becoming pregnant w/in 6 months: no         Significant Psychosocial History     Current Major Lifestyle Changes: no  Any potential unsupportive people: no          Social History  Social History     Tobacco Use    Smoking status: Never    Smokeless tobacco: Never   Substance Use Topics    Alcohol use: Yes     How many times a week do you eat out?  1-2    Do you drink any EtOH?  no   If so, how much?        Do you have upcoming any travel in the next 6 weeks?  no   If so, what do you have planned?          Exercise  How many days a week do you currently exercise?  0 days    Have you ever been told by a physician not to exercise?  no      Objective  /74 (BP Site: Right Upper Arm, Patient Position: Sitting, BP Cuff Size: Large Adult)   Pulse 72   Temp 98.3 °F (36.8 °C) (Oral)   Resp 16   Ht 1.6 m (5' 3\")   Wt 99.6 kg (219 lb 9.6 oz)   SpO2 97%   BMI 38.90 kg/m²   Current Outpatient Medications   Medication Sig Dispense Refill    Semaglutide-Weight Management (WEGOVY) 1 MG/0.5ML SOAJ SC injection Inject 1 mg into the skin every 7 days 2 mL 0    topiramate (TOPAMAX) 25 MG tablet Take 2 tablets by mouth 2 times daily Take 1 tab BID x 2 weeks; then 2 tabs BID

## 2025-04-22 NOTE — PROGRESS NOTES
Identified pt with two pt identifiers (name and ). Reviewed chart in preparation for visit and have obtained necessary documentation.    Guerita Seaman is a 25 y.o. female  Chief Complaint   Patient presents with    Weight Management     2 month follow up     /74 (BP Site: Right Upper Arm, Patient Position: Sitting, BP Cuff Size: Large Adult)   Pulse 72   Temp 98.3 °F (36.8 °C) (Oral)   Resp 16   Ht 1.6 m (5' 3\")   Wt 99.6 kg (219 lb 9.6 oz)   SpO2 97%   BMI 38.90 kg/m²     1. Have you been to the ER, urgent care clinic since your last visit?  Hospitalized since your last visit?no    2. Have you seen or consulted any other health care providers outside of the [facility] since your last visit?  Include any pap smears or colon screening. No    BMI - 38.5

## 2025-06-04 ENCOUNTER — TELEPHONE (OUTPATIENT)
Age: 25
End: 2025-06-04

## 2025-06-04 NOTE — TELEPHONE ENCOUNTER
Patient called regarding Wegovy, asking if she can just stop completely. Having insurance difficulties, and increased cost. Patient requested callback

## 2025-06-05 NOTE — TELEPHONE ENCOUNTER
Followed up with Dr. Jarvis and nurse Mica regarding wegovy. Dr. Jarvis states she will discuss further at Friday appt (tomorrow)     Attempted to call patient to inform, call went to VM. Left VM informing patient of what provider relays and to call back if she needs anything else or has any follow up questions.

## 2025-06-06 ENCOUNTER — TELEMEDICINE (OUTPATIENT)
Age: 25
End: 2025-06-06
Payer: COMMERCIAL

## 2025-06-06 VITALS
WEIGHT: 217 LBS | DIASTOLIC BLOOD PRESSURE: 78 MMHG | SYSTOLIC BLOOD PRESSURE: 118 MMHG | BODY MASS INDEX: 38.45 KG/M2 | HEIGHT: 63 IN

## 2025-06-06 DIAGNOSIS — E66.01 CLASS 2 SEVERE OBESITY DUE TO EXCESS CALORIES WITH SERIOUS COMORBIDITY AND BODY MASS INDEX (BMI) OF 38.0 TO 38.9 IN ADULT (HCC): Primary | ICD-10-CM

## 2025-06-06 DIAGNOSIS — Z62.819 TRAUMA IN CHILDHOOD: ICD-10-CM

## 2025-06-06 DIAGNOSIS — R73.9 BLOOD GLUCOSE ELEVATED: ICD-10-CM

## 2025-06-06 DIAGNOSIS — E78.00 HYPERCHOLESTEROLEMIA: ICD-10-CM

## 2025-06-06 DIAGNOSIS — G93.2 INTRACRANIAL HYPERTENSION: ICD-10-CM

## 2025-06-06 DIAGNOSIS — E66.812 CLASS 2 SEVERE OBESITY DUE TO EXCESS CALORIES WITH SERIOUS COMORBIDITY AND BODY MASS INDEX (BMI) OF 38.0 TO 38.9 IN ADULT (HCC): Primary | ICD-10-CM

## 2025-06-06 PROCEDURE — 99214 OFFICE O/P EST MOD 30 MIN: CPT | Performed by: FAMILY MEDICINE

## 2025-06-06 ASSESSMENT — PATIENT HEALTH QUESTIONNAIRE - PHQ9
2. FEELING DOWN, DEPRESSED OR HOPELESS: NOT AT ALL
SUM OF ALL RESPONSES TO PHQ QUESTIONS 1-9: 0
1. LITTLE INTEREST OR PLEASURE IN DOING THINGS: NOT AT ALL

## 2025-06-06 NOTE — PROGRESS NOTES
Identified pt with two pt identifiers (name and ). Reviewed chart in preparation for visit and have obtained necessary documentation.    Guerita Seaman is a 25 y.o. female  Chief Complaint   Patient presents with    Weight Management     1 month    Medication Management     /78   Ht 1.6 m (5' 3\")   Wt 98.4 kg (217 lb)   BMI 38.44 kg/m²     1. Have you been to the ER, urgent care clinic since your last visit?  Hospitalized since your last visit?no    2. Have you seen or consulted any other health care providers outside of the [facility] since your last visit?  Include any pap smears or colon screening. no   
no          Social History  Social History     Tobacco Use    Smoking status: Never    Smokeless tobacco: Never   Substance Use Topics    Alcohol use: Yes     How many times a week do you eat out?  2    Do you drink any EtOH?  no   If so, how much?        Do you have upcoming any travel in the next 6 weeks?  no   If so, what do you have planned?          Exercise  How many days a week do you currently exercise?  Walking 30 min 5-7 days  Have you ever been told by a physician not to exercise?  no      Objective  /78   Ht 1.6 m (5' 3\")   Wt 98.4 kg (217 lb)   BMI 38.44 kg/m²     Current Outpatient Medications   Medication Sig Dispense Refill    Semaglutide-Weight Management (WEGOVY) 1.7 MG/0.75ML SOAJ SC injection Inject 1.7 mg into the skin every 7 days 3 mL 1    topiramate (TOPAMAX) 25 MG tablet Take 2 tablets by mouth 2 times daily Take 1 tab BID x 2 weeks; then 2 tabs  tablet 5    rizatriptan (MAXALT) 10 MG tablet Take at onset of migraine. May repeat dose in 2 hours if needed. Do not take more then 2 tabs in 24 hours. 9 tablet 5    Levonorgestrel (MIRENA, 52 MG, IU) by IntraUTERine route PLACED 7/12/24      hydrOXYzine HCl (ATARAX) 10 MG tablet Take 1 tablet by mouth every 8 hours as needed for Anxiety 30 tablet 1    ondansetron (ZOFRAN-ODT) 8 MG TBDP disintegrating tablet Place 1 tablet under the tongue every 8 hours as needed for Nausea or Vomiting 8 tablet 1    meclizine (ANTIVERT) 25 MG tablet TAKE 1 TABLET BY MOUTH THREE TIMES A DAY AS NEEDED FOR DIZZINESS      butalbital-acetaminophen-caffeine (FIORICET, ESGIC) -40 MG per tablet TAKE 1 TABLET BY MOUTH EVERY 8 HOURS AS NEEDED FOR HEADACHE       No current facility-administered medications for this visit.         Waist Circumference: See Weight Management Doc Flowsheet  Neck Circumference: See Weight Management Doc Flowsheet  Percent Body Fat: See Weight Management Doc Flowsheet      6/6/2025     9:15 AM 4/22/2025     2:54 PM 4/22/2025

## 2025-06-10 ENCOUNTER — TELEPHONE (OUTPATIENT)
Dept: PRIMARY CARE CLINIC | Facility: CLINIC | Age: 25
End: 2025-06-10

## 2025-06-12 DIAGNOSIS — E66.813 CLASS 3 SEVERE OBESITY DUE TO EXCESS CALORIES WITH SERIOUS COMORBIDITY AND BODY MASS INDEX (BMI) OF 40.0 TO 44.9 IN ADULT (HCC): ICD-10-CM

## 2025-06-12 RX ORDER — SEMAGLUTIDE 1 MG/.5ML
1 INJECTION, SOLUTION SUBCUTANEOUS
Qty: 2 ML | Refills: 0 | Status: SHIPPED | OUTPATIENT
Start: 2025-06-12

## 2025-06-12 NOTE — PROGRESS NOTES
She missed one dose after taking the 1 mg dose for 3 months. Her pharmacist advised her that she needs to go back to the 1 mg dose because the missed that one dose and then start the 1.7 mg dose. Based on the pharmacist comments the patient is now afraid to start the 1.7 without going back to the 1 mg dose. I will go ahead and send the 1mg. I explained the insurance may not cover the 1mg now since she just picked up the 1.7 mg dose  She reports the phx did a test yesterday and it was approved  DB

## 2025-06-12 NOTE — TELEPHONE ENCOUNTER
Patient called in reference to medication (wegovy). Patient was unaware she went up in dose when picking up Rx. Patient spoke with pharmacist regarding a missed dose of the 1 mg. Pharmacist states they do not recommend skipping 1 mg and jumping to 1.7, and to call to have one more refill of 1 mg submitted for patient to acclimate their body to increased dosage.     Advised patient a message would be sent requesting the Rx. Patient understood.

## 2025-06-13 ENCOUNTER — OFFICE VISIT (OUTPATIENT)
Age: 25
End: 2025-06-13
Payer: COMMERCIAL

## 2025-06-13 VITALS
SYSTOLIC BLOOD PRESSURE: 109 MMHG | BODY MASS INDEX: 39.51 KG/M2 | DIASTOLIC BLOOD PRESSURE: 70 MMHG | WEIGHT: 223 LBS | HEART RATE: 94 BPM | HEIGHT: 63 IN

## 2025-06-13 DIAGNOSIS — Z11.3 SCREENING FOR VENEREAL DISEASE: ICD-10-CM

## 2025-06-13 DIAGNOSIS — N89.8 VAGINAL ODOR: ICD-10-CM

## 2025-06-13 DIAGNOSIS — Z80.3 FAMILY HISTORY OF BREAST CANCER: Primary | ICD-10-CM

## 2025-06-13 PROCEDURE — 99395 PREV VISIT EST AGE 18-39: CPT | Performed by: STUDENT IN AN ORGANIZED HEALTH CARE EDUCATION/TRAINING PROGRAM

## 2025-06-17 ENCOUNTER — TELEMEDICINE (OUTPATIENT)
Dept: PRIMARY CARE CLINIC | Facility: CLINIC | Age: 25
End: 2025-06-17
Payer: COMMERCIAL

## 2025-06-17 DIAGNOSIS — F32.A ANXIETY AND DEPRESSION: Primary | ICD-10-CM

## 2025-06-17 DIAGNOSIS — F41.9 ANXIETY AND DEPRESSION: Primary | ICD-10-CM

## 2025-06-17 LAB
A VAGINAE DNA VAG QL NAA+PROBE: ABNORMAL SCORE
BVAB2 DNA VAG QL NAA+PROBE: ABNORMAL SCORE
C ALBICANS DNA VAG QL NAA+PROBE: POSITIVE
C GLABRATA DNA VAG QL NAA+PROBE: NEGATIVE
MEGA1 DNA VAG QL NAA+PROBE: ABNORMAL SCORE
T VAGINALIS DNA VAG QL NAA+PROBE: NEGATIVE

## 2025-06-17 PROCEDURE — 99214 OFFICE O/P EST MOD 30 MIN: CPT | Performed by: FAMILY MEDICINE

## 2025-06-17 RX ORDER — CITALOPRAM HYDROBROMIDE 10 MG/1
10 TABLET ORAL DAILY
Qty: 30 TABLET | Refills: 3 | Status: SHIPPED | OUTPATIENT
Start: 2025-06-17

## 2025-06-17 NOTE — PROGRESS NOTES
Guerita Seaman (:  2000) is a Established patient, here for evaluation of the following:  Other (Wants to discuss Celexa medication. )      Assessment & Plan   Below is the assessment and plan developed based on review of pertinent history, physical exam, labs, studies, and medications.  1. Anxiety and depression  -     citalopram (CELEXA) 10 MG tablet; Take 1 tablet by mouth daily, Disp-30 tablet, R-3Normal    Chronic problem. Uncontrolled. Would like to restart Celexa. Discussed adverse effects of Celexa including headache, nausea, fatigue, low libido. Sometimes these meds can cause worsening mood. If mood worsens stop and RTC. If SI/ HI needs to call 911.  Discussed this can sometimes cause weight gain. She understands this risk. Advised not to take Atarax with this med given risk of interaction. She has not been taking this so we will remove from chart.          Subjective   HPI    Has been struggling with anxiety and depression. Does not feel any particular thing has triggered this before. Had stopped it before because she didn't want to take it but now feels her anxiety and depression has gotten worse. States everything is too much. No thoughts of self harm. Not working with a counselor therapist. Has one but would be interested in some resources. Did not have any adverse effects on the Celexa. Has also tried Zoloft before and the second time she tried it she felt it made her symptoms worse.         There is no problem list on file for this patient.       Current Outpatient Medications on File Prior to Visit   Medication Sig Dispense Refill    Semaglutide-Weight Management (WEGOVY) 1 MG/0.5ML SOAJ SC injection Inject 1 mg into the skin every 7 days 2 mL 0    topiramate (TOPAMAX) 25 MG tablet Take 2 tablets by mouth 2 times daily Take 1 tab BID x 2 weeks; then 2 tabs BID (Patient taking differently: Take 2 tablets by mouth daily Take 1 tab BID x 2 weeks; then 2 tabs BID) 120 tablet 5    rizatriptan

## 2025-06-19 NOTE — PROGRESS NOTES
Guerita Seaman is a 25 y.o. female returns for an annual exam     Chief Complaint   Patient presents with    Annual Exam       No LMP recorded. (Menstrual status: IUD).    Problems: problems - vaginal odor about a few years now.  checked and tested, likes to hear a second opinion  Birth Control: IUD.  Last Pap: see report obtained 1 year(s) ago.  She does not have a history of CHRISSY 2, 3 or cervical cancer.   With regard to the Gardisil vaccine, she not sure      1. Have you been to the ER, urgent care clinic, or hospitalized since your last visit? No    2. Have you seen or consulted any other health care providers outside of the [facility] since your last visit? No    Examination chaperoned by Suha Robertson MA.  
Cancer Mother     Migraines Mother     Other Father         ankylosing spondylitis; Bruce-Danlos    V[place] Disease Father     Migraines Father     Diabetes Maternal Grandmother     Breast Cancer Maternal Grandmother 83    Arthritis Maternal Grandmother     Diabetes Maternal Grandfather         Review of Systems - History obtained from the patient  Constitutional: negative for weight loss, fever, night sweats  HEENT: negative for hearing loss, earache, congestion, snoring, sorethroat  CV: negative for chest pain, palpitations, edema  Resp: negative for cough, shortness of breath, wheezing  GI: negative for change in bowel habits, abdominal pain, black or bloody stools  : negative for frequency, dysuria, hematuria, vaginal discharge  MSK: negative for back pain, joint pain, muscle pain  Breast: negative for breast lumps, nipple discharge, galactorrhea  Skin :negative for itching, rash, hives  Neuro: negative for dizziness, headache, confusion, weakness  Psych: negative for anxiety, depression, change in mood  Heme/lymph: negative for bleeding, bruising, pallor    Physical Exam    /70   Pulse 94   Ht 1.6 m (5' 3\")   Wt 101.2 kg (223 lb)   BMI 39.50 kg/m²     Constitutional  Appearance: well-nourished, well developed, alert, in no acute distress    HENT  Head and Face: appears normal    Neck  Inspection/Palpation: normal appearance, no masses or tenderness  Lymph Nodes: no lymphadenopathy present  Thyroid: gland size normal, nontender, no nodules or masses present on palpation    Chest  Respiratory Effort: breathing unlabored  Auscultation: normal breath sounds    Cardiovascular  Heart:  Auscultation: regular rate and rhythm without murmur    Breasts  Inspection of Breasts: breasts symmetrical, no skin changes, no discharge present, nipple appearance normal, no skin retraction present  Palpation of Breasts and Axillae: no masses present on palpation, no breast tenderness  Axillary Lymph Nodes: no

## 2025-06-20 ENCOUNTER — RESULTS FOLLOW-UP (OUTPATIENT)
Age: 25
End: 2025-06-20

## 2025-06-20 RX ORDER — FLUCONAZOLE 150 MG/1
150 TABLET ORAL ONCE
Qty: 1 TABLET | Refills: 1 | Status: SHIPPED | OUTPATIENT
Start: 2025-06-20 | End: 2025-06-20

## 2025-07-09 DIAGNOSIS — F41.9 ANXIETY AND DEPRESSION: ICD-10-CM

## 2025-07-09 DIAGNOSIS — F32.A ANXIETY AND DEPRESSION: ICD-10-CM

## 2025-07-11 RX ORDER — CITALOPRAM HYDROBROMIDE 10 MG/1
10 TABLET ORAL DAILY
Qty: 90 TABLET | Refills: 2 | Status: SHIPPED | OUTPATIENT
Start: 2025-07-11

## 2025-07-31 NOTE — PROGRESS NOTES
HISTORY OF PRESENT ILLNESS  Guerita Seaman is a 25 y.o. female     HPI New patient presents for evaluation as a high risk patient.  Denies breast mass, skin changes and nipple discharge.  Reports occasional breast pain in the RIGHT breast with pressure.          Breast history -   Referring - Dr. Proctor  No history of breast biopsies      Family history -   Mom - breast cancer at 46; recurrence; genetic testing negative; mom also had uterine cancer  Maternal grandmother - breast cancer at 80      OB History          0    Para   0    Term   0       0    AB   0    Living   0         SAB   0    IAB   0    Ectopic   0    Molar   0    Multiple   0    Live Births   0          Obstetric Comments   Menarche 10, LMP 23, # of children 0, age of 1st delivery -, Hysterectomy/oophorectomy no/no, Breast bx no, history of breast feeding no, BCP yes, Hormone therapy no                 Past Surgical History:   Procedure Laterality Date    TONSILLECTOMY AND ADENOIDECTOMY N/A 2024    TONSILLECTOMY performed by Daren Dumont MD at [facility] OR    WISDOM TOOTH EXTRACTION      WRIST FRACTURE SURGERY Right          Review of Systems      Physical Exam  Constitutional:       Appearance: Normal appearance.   Chest:   Breasts:     Right: Tenderness present. No mass, nipple discharge or skin change.      Left: No mass, nipple discharge, skin change or tenderness.          Comments: Tenderness throughout RIGHT superior breast  Musculoskeletal:      Comments: FROM - UE x 2   Lymphadenopathy:      Upper Body:      Right upper body: No supraclavicular or axillary adenopathy.      Left upper body: No supraclavicular or axillary adenopathy.   Neurological:      Mental Status: She is alert.   Psychiatric:         Attention and Perception: Attention normal.         Mood and Affect: Mood normal.         Speech: Speech normal.         Behavior: Behavior normal.          Ht 1.6 m (5' 3\")   Wt 95.3 kg (210 lb)   BMI 37.20

## 2025-08-01 ENCOUNTER — OFFICE VISIT (OUTPATIENT)
Age: 25
End: 2025-08-01
Payer: COMMERCIAL

## 2025-08-01 VITALS — BODY MASS INDEX: 37.21 KG/M2 | WEIGHT: 210 LBS | HEIGHT: 63 IN

## 2025-08-01 DIAGNOSIS — N60.12 FIBROCYSTIC BREAST CHANGES OF BOTH BREASTS: Primary | ICD-10-CM

## 2025-08-01 DIAGNOSIS — N60.11 FIBROCYSTIC BREAST CHANGES OF BOTH BREASTS: Primary | ICD-10-CM

## 2025-08-01 DIAGNOSIS — Z80.3 FAMILY HISTORY OF BREAST CANCER: ICD-10-CM

## 2025-08-01 PROCEDURE — 99202 OFFICE O/P NEW SF 15 MIN: CPT | Performed by: NURSE PRACTITIONER

## 2025-08-03 DIAGNOSIS — E66.812 CLASS 2 SEVERE OBESITY DUE TO EXCESS CALORIES WITH SERIOUS COMORBIDITY AND BODY MASS INDEX (BMI) OF 38.0 TO 38.9 IN ADULT (HCC): ICD-10-CM

## 2025-08-03 DIAGNOSIS — E66.01 CLASS 2 SEVERE OBESITY DUE TO EXCESS CALORIES WITH SERIOUS COMORBIDITY AND BODY MASS INDEX (BMI) OF 38.0 TO 38.9 IN ADULT (HCC): ICD-10-CM

## 2025-08-04 RX ORDER — SEMAGLUTIDE 1.7 MG/.75ML
INJECTION, SOLUTION SUBCUTANEOUS
Qty: 3 ML | Refills: 0 | Status: SHIPPED | OUTPATIENT
Start: 2025-08-04

## 2025-08-25 ENCOUNTER — OFFICE VISIT (OUTPATIENT)
Age: 25
End: 2025-08-25
Payer: COMMERCIAL

## 2025-08-25 VITALS
TEMPERATURE: 98.6 F | DIASTOLIC BLOOD PRESSURE: 76 MMHG | RESPIRATION RATE: 16 BRPM | HEART RATE: 93 BPM | BODY MASS INDEX: 36.68 KG/M2 | OXYGEN SATURATION: 99 % | WEIGHT: 207 LBS | SYSTOLIC BLOOD PRESSURE: 118 MMHG | HEIGHT: 63 IN

## 2025-08-25 DIAGNOSIS — G44.86 CERVICOGENIC HEADACHE: ICD-10-CM

## 2025-08-25 DIAGNOSIS — G93.2 PSEUDOTUMOR CEREBRI: ICD-10-CM

## 2025-08-25 DIAGNOSIS — G43.709 CHRONIC MIGRAINE W/O AURA, NOT INTRACTABLE, W/O STAT MIGR: Primary | ICD-10-CM

## 2025-08-25 DIAGNOSIS — M54.81 OCCIPITAL NEURALGIA OF RIGHT SIDE: ICD-10-CM

## 2025-08-25 PROCEDURE — 99214 OFFICE O/P EST MOD 30 MIN: CPT | Performed by: PSYCHIATRY & NEUROLOGY

## 2025-08-25 RX ORDER — TOPIRAMATE 25 MG/1
50 TABLET, FILM COATED ORAL 2 TIMES DAILY
Qty: 120 TABLET | Refills: 5 | Status: SHIPPED | OUTPATIENT
Start: 2025-08-25

## 2025-08-25 RX ORDER — BUTALBITAL, ACETAMINOPHEN AND CAFFEINE 50; 325; 40 MG/1; MG/1; MG/1
1 TABLET ORAL EVERY 6 HOURS PRN
Qty: 40 TABLET | Refills: 0 | Status: SHIPPED | OUTPATIENT
Start: 2025-08-25

## 2025-08-25 RX ORDER — RIZATRIPTAN BENZOATE 10 MG/1
TABLET ORAL
Qty: 9 TABLET | Refills: 5 | Status: SHIPPED | OUTPATIENT
Start: 2025-08-25

## 2025-08-25 ASSESSMENT — PATIENT HEALTH QUESTIONNAIRE - PHQ9
1. LITTLE INTEREST OR PLEASURE IN DOING THINGS: NOT AT ALL
SUM OF ALL RESPONSES TO PHQ QUESTIONS 1-9: 0
SUM OF ALL RESPONSES TO PHQ QUESTIONS 1-9: 0
2. FEELING DOWN, DEPRESSED OR HOPELESS: NOT AT ALL
SUM OF ALL RESPONSES TO PHQ QUESTIONS 1-9: 0
SUM OF ALL RESPONSES TO PHQ QUESTIONS 1-9: 0

## 2025-08-28 ENCOUNTER — OFFICE VISIT (OUTPATIENT)
Dept: PRIMARY CARE CLINIC | Facility: CLINIC | Age: 25
End: 2025-08-28
Payer: COMMERCIAL

## 2025-08-28 VITALS
DIASTOLIC BLOOD PRESSURE: 68 MMHG | BODY MASS INDEX: 37.03 KG/M2 | HEART RATE: 91 BPM | RESPIRATION RATE: 18 BRPM | SYSTOLIC BLOOD PRESSURE: 100 MMHG | HEIGHT: 63 IN | WEIGHT: 209 LBS | TEMPERATURE: 97.4 F | OXYGEN SATURATION: 97 %

## 2025-08-28 DIAGNOSIS — E66.01 SEVERE OBESITY (HCC): Primary | ICD-10-CM

## 2025-08-28 DIAGNOSIS — G93.2 INTRACRANIAL HYPERTENSION: ICD-10-CM

## 2025-08-28 DIAGNOSIS — G43.019 INTRACTABLE MIGRAINE WITHOUT AURA AND WITHOUT STATUS MIGRAINOSUS: ICD-10-CM

## 2025-08-28 DIAGNOSIS — H81.4 VERTIGO, CENTRAL: ICD-10-CM

## 2025-08-28 PROCEDURE — 99214 OFFICE O/P EST MOD 30 MIN: CPT | Performed by: INTERNAL MEDICINE

## 2025-08-28 RX ORDER — SEMAGLUTIDE 1.7 MG/.75ML
1.7 INJECTION, SOLUTION SUBCUTANEOUS
Qty: 3 ML | Refills: 1 | Status: SHIPPED | OUTPATIENT
Start: 2025-08-28

## 2025-08-28 ASSESSMENT — PATIENT HEALTH QUESTIONNAIRE - PHQ9
SUM OF ALL RESPONSES TO PHQ QUESTIONS 1-9: 0
1. LITTLE INTEREST OR PLEASURE IN DOING THINGS: NOT AT ALL
SUM OF ALL RESPONSES TO PHQ QUESTIONS 1-9: 0
2. FEELING DOWN, DEPRESSED OR HOPELESS: NOT AT ALL

## (undated) DEVICE — MINOR BASIN -SMH: Brand: MEDLINE INDUSTRIES, INC.

## (undated) DEVICE — EVAC 70 XTRA WAND: Brand: COBLATION

## (undated) DEVICE — SYRINGE MED 5ML STD CLR PLAS LUERLOCK TIP N CTRL DISP

## (undated) DEVICE — TUBING, SUCTION, 1/4" X 12', STRAIGHT: Brand: MEDLINE

## (undated) DEVICE — SYRINGE IRRIG 60ML SFT PLIABLE BLB EZ TO GRP 1 HND USE W/

## (undated) DEVICE — KIT,ANTI FOG,W/SPONGE & FLUID,SOFT PACK: Brand: MEDLINE

## (undated) DEVICE — SOLUTION IV 500ML 0.9% SOD CHL PH 5 INJ USP VIAFLX PLAS

## (undated) DEVICE — CATHETER PH CONT SUCT

## (undated) DEVICE — GLOVE ORANGE PI 7   MSG9070

## (undated) DEVICE — X-RAY DETECTABLE SPONGES,16 PLY: Brand: VISTEC

## (undated) DEVICE — SOLUTION IRRIG 1000ML 0.9% SOD CHL USP POUR PLAS BTL

## (undated) DEVICE — NEEDLE HYPO 25GA L1.5IN BLU POLYPR HUB S STL REG BVL STR